# Patient Record
Sex: MALE | Race: OTHER | HISPANIC OR LATINO | Employment: UNEMPLOYED | URBAN - METROPOLITAN AREA
[De-identification: names, ages, dates, MRNs, and addresses within clinical notes are randomized per-mention and may not be internally consistent; named-entity substitution may affect disease eponyms.]

---

## 2017-01-31 ENCOUNTER — ALLSCRIPTS OFFICE VISIT (OUTPATIENT)
Dept: OTHER | Facility: OTHER | Age: 42
End: 2017-01-31

## 2017-01-31 DIAGNOSIS — E78.5 HYPERLIPIDEMIA: ICD-10-CM

## 2017-01-31 DIAGNOSIS — E03.9 HYPOTHYROIDISM: ICD-10-CM

## 2017-01-31 DIAGNOSIS — R73.01 IMPAIRED FASTING GLUCOSE: ICD-10-CM

## 2017-02-02 ENCOUNTER — APPOINTMENT (OUTPATIENT)
Dept: LAB | Facility: CLINIC | Age: 42
End: 2017-02-02
Payer: COMMERCIAL

## 2017-02-02 DIAGNOSIS — E78.5 HYPERLIPIDEMIA: ICD-10-CM

## 2017-02-02 DIAGNOSIS — R73.01 IMPAIRED FASTING GLUCOSE: ICD-10-CM

## 2017-02-02 DIAGNOSIS — E03.9 HYPOTHYROIDISM: ICD-10-CM

## 2017-02-02 LAB
ALBUMIN SERPL BCP-MCNC: 3.5 G/DL (ref 3.5–5)
ALP SERPL-CCNC: 77 U/L (ref 46–116)
ALT SERPL W P-5'-P-CCNC: 39 U/L (ref 12–78)
ANION GAP SERPL CALCULATED.3IONS-SCNC: 7 MMOL/L (ref 4–13)
AST SERPL W P-5'-P-CCNC: 31 U/L (ref 5–45)
BILIRUB SERPL-MCNC: 0.24 MG/DL (ref 0.2–1)
BUN SERPL-MCNC: 11 MG/DL (ref 5–25)
CALCIUM SERPL-MCNC: 8.8 MG/DL (ref 8.3–10.1)
CHLORIDE SERPL-SCNC: 101 MMOL/L (ref 100–108)
CHOLEST SERPL-MCNC: 284 MG/DL (ref 50–200)
CO2 SERPL-SCNC: 30 MMOL/L (ref 21–32)
CREAT SERPL-MCNC: 1.19 MG/DL (ref 0.6–1.3)
EST. AVERAGE GLUCOSE BLD GHB EST-MCNC: 146 MG/DL
GFR SERPL CREATININE-BSD FRML MDRD: >60 ML/MIN/1.73SQ M
GLUCOSE SERPL-MCNC: 103 MG/DL (ref 65–140)
HBA1C MFR BLD: 6.7 % (ref 4.2–6.3)
HDLC SERPL-MCNC: 42 MG/DL (ref 40–60)
LDLC SERPL DIRECT ASSAY-MCNC: 171 MG/DL (ref 0–100)
POTASSIUM SERPL-SCNC: 4 MMOL/L (ref 3.5–5.3)
PROT SERPL-MCNC: 7.7 G/DL (ref 6.4–8.2)
SODIUM SERPL-SCNC: 138 MMOL/L (ref 136–145)
T3 SERPL-MCNC: 0.8 NG/ML (ref 0.6–1.8)
T4 FREE SERPL-MCNC: 0.28 NG/DL (ref 0.76–1.46)
TRIGL SERPL-MCNC: 555 MG/DL
TSH SERPL DL<=0.05 MIU/L-ACNC: 82.2 UIU/ML (ref 0.36–3.74)

## 2017-02-02 PROCEDURE — 84439 ASSAY OF FREE THYROXINE: CPT

## 2017-02-02 PROCEDURE — 80061 LIPID PANEL: CPT

## 2017-02-02 PROCEDURE — 83036 HEMOGLOBIN GLYCOSYLATED A1C: CPT

## 2017-02-02 PROCEDURE — 80053 COMPREHEN METABOLIC PANEL: CPT

## 2017-02-02 PROCEDURE — 84443 ASSAY THYROID STIM HORMONE: CPT

## 2017-02-02 PROCEDURE — 36415 COLL VENOUS BLD VENIPUNCTURE: CPT

## 2017-02-02 PROCEDURE — 84480 ASSAY TRIIODOTHYRONINE (T3): CPT

## 2017-02-02 PROCEDURE — 83721 ASSAY OF BLOOD LIPOPROTEIN: CPT

## 2017-02-07 ENCOUNTER — ALLSCRIPTS OFFICE VISIT (OUTPATIENT)
Dept: OTHER | Facility: OTHER | Age: 42
End: 2017-02-07

## 2017-05-08 DIAGNOSIS — E03.9 HYPOTHYROIDISM: ICD-10-CM

## 2017-05-08 DIAGNOSIS — E78.5 HYPERLIPIDEMIA: ICD-10-CM

## 2017-05-08 DIAGNOSIS — E11.9 TYPE 2 DIABETES MELLITUS WITHOUT COMPLICATIONS (HCC): ICD-10-CM

## 2018-01-13 VITALS
HEIGHT: 69 IN | SYSTOLIC BLOOD PRESSURE: 134 MMHG | TEMPERATURE: 97.6 F | WEIGHT: 315 LBS | BODY MASS INDEX: 46.65 KG/M2 | DIASTOLIC BLOOD PRESSURE: 98 MMHG | HEART RATE: 84 BPM

## 2018-01-14 VITALS
BODY MASS INDEX: 46.65 KG/M2 | SYSTOLIC BLOOD PRESSURE: 120 MMHG | TEMPERATURE: 98.2 F | WEIGHT: 315 LBS | HEART RATE: 68 BPM | HEIGHT: 69 IN | DIASTOLIC BLOOD PRESSURE: 88 MMHG

## 2018-08-02 PROBLEM — E11.9 DIABETES MELLITUS, NEW ONSET (HCC): Status: ACTIVE | Noted: 2017-02-07

## 2018-08-02 RX ORDER — LEVOTHYROXINE SODIUM 0.05 MG/1
1 TABLET ORAL DAILY
COMMUNITY
Start: 2015-11-03 | End: 2018-08-29 | Stop reason: SDUPTHER

## 2018-08-03 ENCOUNTER — OFFICE VISIT (OUTPATIENT)
Dept: INTERNAL MEDICINE CLINIC | Facility: CLINIC | Age: 43
End: 2018-08-03
Payer: COMMERCIAL

## 2018-08-03 VITALS
WEIGHT: 312.61 LBS | TEMPERATURE: 98.5 F | SYSTOLIC BLOOD PRESSURE: 112 MMHG | DIASTOLIC BLOOD PRESSURE: 80 MMHG | HEART RATE: 84 BPM | BODY MASS INDEX: 44.75 KG/M2 | HEIGHT: 70 IN

## 2018-08-03 DIAGNOSIS — N43.2 OTHER HYDROCELE: ICD-10-CM

## 2018-08-03 DIAGNOSIS — E04.2 NON-TOXIC MULTINODULAR GOITER: ICD-10-CM

## 2018-08-03 DIAGNOSIS — E03.8 HYPOTHYROIDISM DUE TO HASHIMOTO'S THYROIDITIS: Primary | ICD-10-CM

## 2018-08-03 DIAGNOSIS — E06.3 HYPOTHYROIDISM DUE TO HASHIMOTO'S THYROIDITIS: Primary | ICD-10-CM

## 2018-08-03 DIAGNOSIS — N50.89 TESTICLE SWELLING: ICD-10-CM

## 2018-08-03 DIAGNOSIS — R40.0 DAYTIME SOMNOLENCE: ICD-10-CM

## 2018-08-03 DIAGNOSIS — E78.2 MIXED HYPERLIPIDEMIA: ICD-10-CM

## 2018-08-03 DIAGNOSIS — R06.83 SNORING: ICD-10-CM

## 2018-08-03 DIAGNOSIS — B35.6 TINEA CRURIS: ICD-10-CM

## 2018-08-03 DIAGNOSIS — E11.9 DIABETES MELLITUS, NEW ONSET (HCC): ICD-10-CM

## 2018-08-03 PROCEDURE — 3725F SCREEN DEPRESSION PERFORMED: CPT | Performed by: PHYSICIAN ASSISTANT

## 2018-08-03 PROCEDURE — 99214 OFFICE O/P EST MOD 30 MIN: CPT | Performed by: PHYSICIAN ASSISTANT

## 2018-08-03 RX ORDER — KETOCONAZOLE 20 MG/G
CREAM TOPICAL DAILY
Qty: 30 G | Refills: 1 | Status: SHIPPED | OUTPATIENT
Start: 2018-08-03 | End: 2019-08-30

## 2018-08-03 RX ORDER — AZITHROMYCIN 250 MG/1
TABLET, FILM COATED ORAL
COMMUNITY
Start: 2018-07-24 | End: 2018-08-03 | Stop reason: ALTCHOICE

## 2018-08-03 RX ORDER — IBUPROFEN 800 MG/1
TABLET ORAL
COMMUNITY
Start: 2018-07-24 | End: 2018-08-03 | Stop reason: ALTCHOICE

## 2018-08-03 NOTE — PROGRESS NOTES
Assessment/Plan:    No problem-specific Assessment & Plan notes found for this encounter  Diagnoses and all orders for this visit:    Hypothyroidism due to Hashimoto's thyroiditis  -     T3  -     T4, free  -     TSH, 3rd generation    Diabetes mellitus, new onset ([facility] [address] )  -     Comprehensive metabolic panel  -     Hemoglobin A1C  -     Microalbumin / creatinine urine ratio    Mixed hyperlipidemia  -     Lipid Panel with Direct LDL reflex    Tinea cruris  -     ketoconazole (NIZORAL) 2 % cream; Apply topically daily for 30 days    Testicle swelling    Snoring  -     Ambulatory referral to Sleep Medicine; Future    Daytime somnolence  -     Ambulatory referral to Sleep Medicine; Future    Other hydrocele  -     US scrotum and testicles; Future  -     Ambulatory referral to Urology; Future    Non-toxic multinodular goiter  -     US thyroid; Future    Other orders  -     levothyroxine 50 mcg tablet; Take 1 tablet by mouth daily  -     Discontinue: azithromycin (ZITHROMAX) 250 mg tablet;   -     Discontinue: ibuprofen (MOTRIN) 800 mg tablet;           Subjective:      Patient ID: Boo Alonso is a 41 y o  male  Patient last seen 1 5 years ago  Was diagnosed with DM and elevated cholesterol and never returned  Hypothyroidism, only took the script from 1 5 years ago  Here with new complaint of R testicle is bigger noticed this about 3 months ago, not pain but feels heavy and slight pinching  No change to urination  No trauma or injury    Patient also complaining of a bilateral rash to his groin  Patient has had this for the past 3-4 months  Patient reports he originally switch to boxer's due to the rash however with the testicle enlargement he switched back to briefs as it feels better  Patient also admits that he feels tired  He reports much of the day he feels okay but by mid to late afternoon he is very tired and often takes a nap      When asked about snoring his wife who accompanied him to this appointment says he does snore a lot and sometimes gasping  The following portions of the patient's history were reviewed and updated as appropriate: allergies, current medications, past family history, past medical history, past social history, past surgical history and problem list     Review of Systems   Constitutional: Positive for fatigue  HENT: Negative  Eyes: Negative  Negative for visual disturbance  Respiratory: Negative  Negative for cough and shortness of breath  Cardiovascular: Negative  Negative for chest pain, palpitations and leg swelling  Gastrointestinal: Negative for abdominal pain  Endocrine: Positive for heat intolerance and polyphagia  Negative for polydipsia and polyuria  Genitourinary: Positive for testicular pain  Negative for difficulty urinating, dysuria and frequency  Musculoskeletal: Negative  Skin: Positive for rash  Groin rash   Neurological: Negative for dizziness and headaches  Psychiatric/Behavioral: Negative  Objective:      /80 (BP Location: Left arm, Patient Position: Sitting, Cuff Size: Adult)   Pulse 84   Temp 98 5 °F (36 9 °C) (Oral)   Ht 5' 10" (1 778 m)   Wt (!) 142 kg (312 lb 9 8 oz)   BMI 44 86 kg/m²          Physical Exam   Constitutional: He is oriented to person, place, and time  He appears well-developed and well-nourished  HENT:   Head: Normocephalic and atraumatic  Narrow but patent airway   Eyes: Pupils are equal, round, and reactive to light  Neck: Neck supple  Thyromegaly present  Cardiovascular: Normal rate, regular rhythm and normal heart sounds  No murmur heard  Pulmonary/Chest: He is in respiratory distress  He has wheezes  Abdominal: Soft  Bowel sounds are normal  There is no tenderness  Genitourinary:       Left testis shows no mass and no tenderness     Genitourinary Comments: Chaperone in room during exam    Unable to palpate R testicle due to the swelling / hydrocele    L testicle no mass, no tenderness       Musculoskeletal: He exhibits edema (bilat LE non pitting oedema)  Lymphadenopathy:     He has no cervical adenopathy  Neurological: He is alert and oriented to person, place, and time  Skin: Rash noted  Moderate bilateral tines cruris   Psychiatric: He has a normal mood and affect   His behavior is normal

## 2018-08-03 NOTE — PATIENT INSTRUCTIONS
Get all the labs completed,    When you return for your results we will discuss what dose of thyroid medication as well as medications for diabetes will be started  Sched the ultrasound of scrotum for hydrocele and thyroid  Sched with urologist  Sched with sleep center  We discussed you likely have sleep apnea and this can continue to contribute to your weight, fatigue as well as sugar levels  Apt with me after labs and ultrasounds      We will complete your diabetic exam is when you return    This to include foot and eye exams

## 2018-08-07 ENCOUNTER — HOSPITAL ENCOUNTER (OUTPATIENT)
Dept: RADIOLOGY | Facility: HOSPITAL | Age: 43
Discharge: HOME | End: 2018-08-07
Payer: COMMERCIAL

## 2018-08-07 ENCOUNTER — APPOINTMENT (OUTPATIENT)
Dept: LAB | Facility: CLINIC | Age: 43
End: 2018-08-07
Payer: COMMERCIAL

## 2018-08-07 DIAGNOSIS — N43.2 OTHER HYDROCELE: ICD-10-CM

## 2018-08-07 DIAGNOSIS — E04.2 NON-TOXIC MULTINODULAR GOITER: ICD-10-CM

## 2018-08-07 LAB
ALBUMIN SERPL BCP-MCNC: 3.4 G/DL (ref 3.5–5)
ALP SERPL-CCNC: 72 U/L (ref 46–116)
ALT SERPL W P-5'-P-CCNC: 33 U/L (ref 12–78)
ANION GAP SERPL CALCULATED.3IONS-SCNC: 8 MMOL/L (ref 4–13)
AST SERPL W P-5'-P-CCNC: 29 U/L (ref 5–45)
BILIRUB SERPL-MCNC: 0.29 MG/DL (ref 0.2–1)
BUN SERPL-MCNC: 15 MG/DL (ref 5–25)
CALCIUM SERPL-MCNC: 8.7 MG/DL (ref 8.3–10.1)
CHLORIDE SERPL-SCNC: 104 MMOL/L (ref 100–108)
CHOLEST SERPL-MCNC: 281 MG/DL (ref 50–200)
CO2 SERPL-SCNC: 25 MMOL/L (ref 21–32)
CREAT SERPL-MCNC: 1.13 MG/DL (ref 0.6–1.3)
CREAT UR-MCNC: 182 MG/DL
EST. AVERAGE GLUCOSE BLD GHB EST-MCNC: 143 MG/DL
GFR SERPL CREATININE-BSD FRML MDRD: 80 ML/MIN/1.73SQ M
GLUCOSE P FAST SERPL-MCNC: 129 MG/DL (ref 65–99)
HBA1C MFR BLD: 6.6 % (ref 4.2–6.3)
HDLC SERPL-MCNC: 32 MG/DL (ref 40–60)
LDLC SERPL DIRECT ASSAY-MCNC: 153 MG/DL (ref 0–100)
MICROALBUMIN UR-MCNC: 16 MG/L (ref 0–20)
MICROALBUMIN/CREAT 24H UR: 9 MG/G CREATININE (ref 0–30)
POTASSIUM SERPL-SCNC: 4.1 MMOL/L (ref 3.5–5.3)
PROT SERPL-MCNC: 7.4 G/DL (ref 6.4–8.2)
SODIUM SERPL-SCNC: 137 MMOL/L (ref 136–145)
T3 SERPL-MCNC: 0.8 NG/ML (ref 0.6–1.8)
T4 FREE SERPL-MCNC: 0.29 NG/DL (ref 0.76–1.46)
TRIGL SERPL-MCNC: 843 MG/DL
TSH SERPL DL<=0.05 MIU/L-ACNC: 67.6 UIU/ML (ref 0.36–3.74)

## 2018-08-07 PROCEDURE — 83036 HEMOGLOBIN GLYCOSYLATED A1C: CPT | Performed by: PHYSICIAN ASSISTANT

## 2018-08-07 PROCEDURE — 82043 UR ALBUMIN QUANTITATIVE: CPT | Performed by: PHYSICIAN ASSISTANT

## 2018-08-07 PROCEDURE — 82570 ASSAY OF URINE CREATININE: CPT | Performed by: PHYSICIAN ASSISTANT

## 2018-08-07 PROCEDURE — 80061 LIPID PANEL: CPT | Performed by: PHYSICIAN ASSISTANT

## 2018-08-07 PROCEDURE — 83721 ASSAY OF BLOOD LIPOPROTEIN: CPT | Performed by: PHYSICIAN ASSISTANT

## 2018-08-07 PROCEDURE — 84480 ASSAY TRIIODOTHYRONINE (T3): CPT | Performed by: PHYSICIAN ASSISTANT

## 2018-08-07 PROCEDURE — 84443 ASSAY THYROID STIM HORMONE: CPT | Performed by: PHYSICIAN ASSISTANT

## 2018-08-07 PROCEDURE — 76870 US EXAM SCROTUM: CPT

## 2018-08-07 PROCEDURE — 80053 COMPREHEN METABOLIC PANEL: CPT | Performed by: PHYSICIAN ASSISTANT

## 2018-08-07 PROCEDURE — 36415 COLL VENOUS BLD VENIPUNCTURE: CPT | Performed by: PHYSICIAN ASSISTANT

## 2018-08-07 PROCEDURE — 84439 ASSAY OF FREE THYROXINE: CPT | Performed by: PHYSICIAN ASSISTANT

## 2018-08-07 PROCEDURE — 3061F NEG MICROALBUMINURIA REV: CPT | Performed by: PHYSICIAN ASSISTANT

## 2018-08-07 PROCEDURE — 76536 US EXAM OF HEAD AND NECK: CPT

## 2018-08-09 ENCOUNTER — TELEPHONE (OUTPATIENT)
Dept: INTERNAL MEDICINE CLINIC | Facility: CLINIC | Age: 43
End: 2018-08-09

## 2018-08-13 NOTE — TELEPHONE ENCOUNTER
ATTEMPTED TO CONTACT PT  AND HIS VOICEMAIL STATED "AT PRESCRIBERS REQUEST THIS NUMBER DOES NOT ACCEPT INCOMING CALLS "

## 2018-08-14 NOTE — TELEPHONE ENCOUNTER
3RD ATTEMPT TO CONTACT PATIENT , I RECEIVED THE SAME MSG AS SHIMA  CAN A LETTER PLEASE BE MAILED OUT TO PATIENTS HOME INFORMING HIM THAT WE HAVE BEEN TRYING TO CONTACT HIM REGARDING U/S RESULTS

## 2018-08-20 ENCOUNTER — TELEPHONE (OUTPATIENT)
Dept: UROLOGY | Facility: MEDICAL CENTER | Age: 43
End: 2018-08-20

## 2018-08-20 NOTE — TELEPHONE ENCOUNTER
Complaint/Diagnosis: Hydrocele    Insurance: [employer]    History of cancer: N/A    Previous Urologist: N/A    Outside testing/ Where: N/A    If yes, What kind: N/A    Records Requested/Where: N/A

## 2018-08-29 ENCOUNTER — OFFICE VISIT (OUTPATIENT)
Dept: INTERNAL MEDICINE CLINIC | Facility: CLINIC | Age: 43
End: 2018-08-29
Payer: COMMERCIAL

## 2018-08-29 VITALS
BODY MASS INDEX: 44.38 KG/M2 | WEIGHT: 309.97 LBS | HEART RATE: 60 BPM | HEIGHT: 70 IN | DIASTOLIC BLOOD PRESSURE: 84 MMHG | TEMPERATURE: 97.8 F | SYSTOLIC BLOOD PRESSURE: 120 MMHG

## 2018-08-29 DIAGNOSIS — E78.2 MIXED HYPERLIPIDEMIA: ICD-10-CM

## 2018-08-29 DIAGNOSIS — E11.9 CONTROLLED TYPE 2 DIABETES MELLITUS WITHOUT COMPLICATION, WITHOUT LONG-TERM CURRENT USE OF INSULIN (HCC): ICD-10-CM

## 2018-08-29 DIAGNOSIS — E66.01 CLASS 3 SEVERE OBESITY DUE TO EXCESS CALORIES WITH SERIOUS COMORBIDITY AND BODY MASS INDEX (BMI) OF 45.0 TO 49.9 IN ADULT (HCC): ICD-10-CM

## 2018-08-29 DIAGNOSIS — E06.3 HYPOTHYROIDISM DUE TO HASHIMOTO'S THYROIDITIS: Primary | ICD-10-CM

## 2018-08-29 DIAGNOSIS — N43.2 OTHER HYDROCELE: ICD-10-CM

## 2018-08-29 DIAGNOSIS — E03.8 HYPOTHYROIDISM DUE TO HASHIMOTO'S THYROIDITIS: Primary | ICD-10-CM

## 2018-08-29 PROBLEM — R40.0 DAYTIME SOMNOLENCE: Status: RESOLVED | Noted: 2018-08-03 | Resolved: 2018-08-29

## 2018-08-29 PROBLEM — N50.89 TESTICLE SWELLING: Status: RESOLVED | Noted: 2018-08-03 | Resolved: 2018-08-29

## 2018-08-29 LAB
LEFT EYE DIABETIC RETINOPATHY: NORMAL
LEFT EYE IMAGE QUALITY: NORMAL
RIGHT EYE DIABETIC RETINOPATHY: NORMAL
RIGHT EYE IMAGE QUALITY: NORMAL
SEVERITY (EYE EXAM): NORMAL

## 2018-08-29 PROCEDURE — 99214 OFFICE O/P EST MOD 30 MIN: CPT | Performed by: PHYSICIAN ASSISTANT

## 2018-08-29 PROCEDURE — 92250 FUNDUS PHOTOGRAPHY W/I&R: CPT | Performed by: PHYSICIAN ASSISTANT

## 2018-08-29 PROCEDURE — 3072F LOW RISK FOR RETINOPATHY: CPT | Performed by: PHYSICIAN ASSISTANT

## 2018-08-29 RX ORDER — ATORVASTATIN CALCIUM 20 MG/1
20 TABLET, FILM COATED ORAL DAILY
Qty: 90 TABLET | Refills: 0 | Status: SHIPPED | OUTPATIENT
Start: 2018-08-29 | End: 2018-11-30 | Stop reason: SDUPTHER

## 2018-08-29 RX ORDER — LEVOTHYROXINE SODIUM 0.05 MG/1
50 TABLET ORAL DAILY
Qty: 90 TABLET | Refills: 0 | Status: SHIPPED | OUTPATIENT
Start: 2018-08-29 | End: 2018-10-08 | Stop reason: ALTCHOICE

## 2018-08-29 NOTE — PROGRESS NOTES
Assessment/Plan:    No problem-specific Assessment & Plan notes found for this encounter  Diagnoses and all orders for this visit:    Hypothyroidism due to Hashimoto's thyroiditis  -     levothyroxine 50 mcg tablet; Take 1 tablet (50 mcg total) by mouth daily for 90 days  -     T4, free; Future  -     TSH, 3rd generation; Future    Controlled type 2 diabetes mellitus without complication, without long-term current use of insulin (HCC)  -     metFORMIN (GLUCOPHAGE) 500 mg tablet; Take 1 tablet (500 mg total) by mouth daily for 180 days  -     Hemoglobin A1C; Future  -     POCT diabetic eye exam    Other hydrocele    Mixed hyperlipidemia  -     atorvastatin (LIPITOR) 20 mg tablet; Take 1 tablet (20 mg total) by mouth daily for 90 days  -     Hepatic function panel; Future  -     Lipid Panel with Direct LDL reflex; Future    Class 3 severe obesity due to excess calories with serious comorbidity and body mass index (BMI) of 45 0 to 49 9 in adult [facility])          Subjective:      Patient ID: Vasquez Johnson is a 41 y o  male      Pt here for result review    Hydrocele R large aware testicles normal and is sched with urologist 8/30/18, confirms wearing briefs for support  Thyroid is enlarged but no nodules meet criteria for biopsy  TSH significantly elevated and T4 is low at 0 2      Cholesterol also sig elevated as are TG >800 admits eats fast foods most the time  Discussed thyroid also playing a roll on elevated lipids but most significant is diet of fast foods and weight, lack of exercise    Remains DM with A1C of 6 6 and in 2017 was 6 7  Will start on metformin to help and slight weigh loss assistance with the medication    DM foot exam OK will get eye exam IRIS scan today    Has sleep consultation on 9/19 for sleep apnea eval          The following portions of the patient's history were reviewed and updated as appropriate: allergies, current medications, past family history, past medical history, past social history, past surgical history and problem list     Review of Systems   Constitutional: Positive for fatigue  Negative for appetite change, chills, fever and unexpected weight change  HENT: Negative  Eyes: Negative for visual disturbance  Respiratory: Positive for shortness of breath (at night wakes from sleep)  Cardiovascular: Positive for leg swelling  Negative for chest pain and palpitations  Pt aware leg swelling a combination of thyroid, sleep apnea and high salt diet   Gastrointestinal: Positive for constipation  Negative for abdominal pain, diarrhea and vomiting  Endocrine: Positive for polydipsia and polyuria  Negative for cold intolerance  Genitourinary: Positive for frequency  Negative for difficulty urinating  Musculoskeletal: Negative  Skin:        Overall feels dry   Neurological: Negative for tremors, light-headedness and headaches  Psychiatric/Behavioral: Negative  Objective:      /84 (BP Location: Left arm, Patient Position: Sitting, Cuff Size: Large)   Pulse 60   Temp 97 8 °F (36 6 °C) (Oral)   Ht 5' 9 5" (1 765 m)   Wt (!) 141 kg (309 lb 15 5 oz)   BMI 45 12 kg/m²          Physical Exam   Constitutional: He is oriented to person, place, and time  He appears well-developed and well-nourished  He appears distressed  HENT:   Head: Normocephalic and atraumatic  Eyes: Pupils are equal, round, and reactive to light  Neck: Normal range of motion  No tracheal deviation present  Thyromegaly present  Large short neck   Cardiovascular: Normal rate and regular rhythm  Pulses are no weak pulses  Murmur heard  Pulses:       Dorsalis pedis pulses are 2+ on the right side, and 2+ on the left side  Pulmonary/Chest: Effort normal and breath sounds normal  He has no wheezes  He has no rales  Abdominal: Soft  He exhibits no distension  There is no tenderness  Limited by body habitus   Musculoskeletal: Normal range of motion   He exhibits edema (bilateral non pitting, no ulcers, no varicose veins)  Feet:   Right Foot:   Skin Integrity: Positive for callus and dry skin  Negative for ulcer, skin breakdown, erythema or warmth  Left Foot:   Skin Integrity: Positive for callus and dry skin  Negative for ulcer, skin breakdown, erythema or warmth  Lymphadenopathy:     He has no cervical adenopathy  Neurological: He is alert and oriented to person, place, and time  He has normal reflexes  Skin: Skin is dry  No rash noted  No erythema  Mild generalized dryness, no rash or lesions   Psychiatric: He has a normal mood and affect  His behavior is normal        Patient's shoes and socks removed  Right Foot/Ankle   Right Foot Inspection  Skin Exam: skin intact, dry skin, callus and callus no warmth, no erythema, no maceration, no abnormal color, no pre-ulcer and no ulcer                      Callus Size (cm):0 5      Sensory   Vibration: intact  Proprioception: intact   Monofilament testing: intact  Vascular    The right DP pulse is 2+  Left Foot/Ankle  Left Foot Inspection  Skin Exam: skin intact, dry skin and callusno warmth, no erythema, no maceration, normal color, no pre-ulcer and no ulcer                     Callus Size (cm): 0 5                    Sensory   Vibration: intact  Proprioception: intact  Monofilament: intact  Vascular    The left DP pulse is 2+  Assign Risk Category:  No deformity present; No loss of protective sensation;  No weak pulses       Risk: 0

## 2018-08-29 NOTE — PATIENT INSTRUCTIONS
Start the levothyroxine, take morning with water only and no other food / drinks for 30 to 60 minutes  Thyroid blood test in October    Start the Metformin 1 a day WITH dinner for diabetes  Start the atorvastatin at bedtime for cholesterol  Stop the fast foods  Read the additional information for diet changes  New labs for sugar and cholesterol as dated end of Nov    DM foot and eye exams today    Increase walking and elevate legs to help reduce swelling  Avoid high salt foods and drinks and this includes soda as also has sugar and salt    Heart Healthy Diet   AMBULATORY CARE:   A heart healthy diet  is an eating plan low in total fat, unhealthy fats, and sodium (salt)  A heart healthy diet helps decrease your risk for heart disease and stroke  Limit the amount of fat you eat to 25% to 35% of your total daily calories  Limit sodium to less than 2,300 mg each day  Healthy fats:  Healthy fats can help improve cholesterol levels  The risk for heart disease is decreased when cholesterol levels are normal  Choose healthy fats, such as the following:  · Unsaturated fat  is found in foods such as soybean, canola, olive, corn, and safflower oils  It is also found in soft tub margarine that is made with liquid vegetable oil  · Omega-3 fat  is found in certain fish, such as salmon, tuna, and trout, and in walnuts and flaxseed  Unhealthy fats:  Unhealthy fats can cause unhealthy cholesterol levels in your blood and increase your risk of heart disease  Limit unhealthy fats, such as the following:  · Cholesterol  is found in animal foods, such as eggs and lobster, and in dairy products made from whole milk  Limit cholesterol to less than 300 milligrams (mg) each day  You may need to limit cholesterol to 200 mg each day if you have heart disease  · Saturated fat  is found in meats, such as martinez and hamburger  It is also found in chicken or turkey skin, whole milk, and butter   Limit saturated fat to less than 7% of your total daily calories  Limit saturated fat to less than 6% if you have heart disease or are at increased risk for it  · Trans fat  is found in packaged foods, such as potato chips and cookies  It is also in hard margarine, some fried foods, and shortening  Avoid trans fats as much as possible    Heart healthy foods and drinks to include:  Ask your dietitian or healthcare provider how many servings to have from each of the following food groups:  · Grains:      ¨ Whole-wheat breads, cereals, and pastas, and brown rice    ¨ Low-fat, low-sodium crackers and chips    · Vegetables:      ¨ Broccoli, green beans, green peas, and spinach    ¨ Collards, kale, and lima beans    ¨ Carrots, sweet potatoes, tomatoes, and peppers    ¨ Canned vegetables with no salt added    · Fruits:      ¨ Bananas, peaches, pears, and pineapple    ¨ Grapes, raisins, and dates    ¨ Oranges, tangerines, grapefruit, orange juice, and grapefruit juice    ¨ Apricots, mangoes, melons, and papaya    ¨ Raspberries and strawberries    ¨ Canned fruit with no added sugar    · Low-fat dairy products:      ¨ Nonfat (skim) milk, 1% milk, and low-fat almond, cashew, or soy milks fortified with calcium    ¨ Low-fat cheese, regular or frozen yogurt, and cottage cheese    · Meats and proteins , such as lean cuts of beef and pork (loin, leg, round), skinless chicken and turkey, legumes, soy products, egg whites, and nuts  Foods and drinks to limit or avoid:  Ask your dietitian or healthcare provider about these and other foods that are high in unhealthy fat, sodium, and sugar:  · Snack or packaged foods , such as frozen dinners, cookies, macaroni and cheese, and cereals with more than 300 mg of sodium per serving    · Canned or dry mixes  for cakes, soups, sauces, or gravies    · Vegetables with added sodium , such as instant potatoes, vegetables with added sauces, or regular canned vegetables    · Other foods high in sodium , such as ketchup, barbecue sauce, salad dressing, pickles, olives, soy sauce, and miso    · High-fat dairy foods  such as whole or 2% milk, cream cheese, or sour cream, and cheeses     · High-fat protein foods  such as high-fat cuts of beef (T-bone steaks, ribs), chicken or turkey with skin, and organ meats, such as liver    · Cured or smoked meats , such as hot dogs, martinez, and sausage    · Unhealthy fats and oils , such as butter, stick margarine, shortening, and cooking oils such as coconut or palm oil    · Food and drinks high in sugar , such as soft drinks (soda), sports drinks, sweetened tea, candy, cake, cookies, pies, and doughnuts  Other diet guidelines to follow:   · Eat more foods containing omega-3 fats  Eat fish high in omega-3 fats at least 2 times a week  · Limit alcohol  Too much alcohol can damage your heart and raise your blood pressure  Women should limit alcohol to 1 drink a day  Men should limit alcohol to 2 drinks a day  A drink of alcohol is 12 ounces of beer, 5 ounces of wine, or 1½ ounces of liquor  · Choose low-sodium foods  High-sodium foods can lead to high blood pressure  Add little or no salt to food you prepare  Use herbs and spices in place of salt  · Eat more fiber  to help lower cholesterol levels  Eat at least 5 servings of fruits and vegetables each day  Eat 3 ounces of whole-grain foods each day  Legumes (beans) are also a good source of fiber  Lifestyle guidelines:   · Do not smoke  Nicotine and other chemicals in cigarettes and cigars can cause lung and heart damage  Ask your healthcare provider for information if you currently smoke and need help to quit  E-cigarettes or smokeless tobacco still contain nicotine  Talk to your healthcare provider before you use these products  · Exercise regularly  to help you maintain a healthy weight and improve your blood pressure and cholesterol levels  Ask your healthcare provider about the best exercise plan for you   Do not start an exercise program without asking your healthcare provider  Follow up with your healthcare provider as directed:  Write down your questions so you remember to ask them during your visits  © 2017 [employer] Information is for End User's use only and may not be sold, redistributed or otherwise used for commercial purposes  All illustrations and images included in CareNotes® are the copyrighted property of [employer]  or [employer]  The above information is an  only  It is not intended as medical advice for individual conditions or treatments  Talk to your doctor, nurse or pharmacist before following any medical regimen to see if it is safe and effective for you  Hypothyroidism   AMBULATORY CARE:   Hypothyroidism  is a condition that develops when the thyroid gland does not make enough thyroid hormone  Thyroid hormones help control body temperature, heart rate, growth, and weight  Common signs and symptoms include the following: The signs and symptoms may develop slowly, sometimes over several years  · Exhaustion    · Sensitivity to cold    · Headaches or decreased concentration    · Muscle aches or weakness    · Constipation     · Dry, flaky skin or brittle nails    · Thinning hair    · Heavy or irregular monthly periods    · Depression or irritability  Call 911 for any of the following:   · You have sudden chest pain or shortness of breath  · You have a seizure  · You feel like you are going to faint  Seek care immediately if:   · You have diarrhea, tremors, or trouble sleeping  · Your legs, ankles, or feet are swollen  Contact your healthcare provider if:   · You have a fever  · You have chills, a cough, or feel weak and achy  · You have pain and swelling in your muscles and joints  · Your skin is itchy, swollen, or you have a rash  · Your signs and symptoms return or get worse, even after treatment      · You have questions or concerns about your condition or care  Treatment:  Thyroid hormone replacement medicine may bring your thyroid hormone level back to normal  Ask your healthcare provider for more information on other medicines you may need  Follow up with your healthcare provider as directed: You may need to return for more blood tests to check your thyroid hormone level  This will show if you are getting the right amount of thyroid medicine  Write down your questions so you remember to ask them during your visits  © 2017 [employer] Information is for End User's use only and may not be sold, redistributed or otherwise used for commercial purposes  All illustrations and images included in CareNotes® are the copyrighted property of [employer]  or [employer]  The above information is an  only  It is not intended as medical advice for individual conditions or treatments  Talk to your doctor, nurse or pharmacist before following any medical regimen to see if it is safe and effective for you  Basic Carbohydrate Counting   AMBULATORY CARE:   Carbohydrate counting  is a way to plan your meals by counting the amount of carbohydrate in foods  Carbohydrates are the sugars, starches, and fiber found in fruit, grains, vegetables, and milk products  Carbohydrates increase your blood sugar levels  Carbohydrate counting can help you eat the right amount of carbohydrate to keep your blood sugar levels under control  What you need to know about planning meals using carbohydrate counting:  · A dietitian or healthcare provider will help you develop a healthy meal plan that works best for you  You will be taught how much carbohydrate to eat or drink for each meal and snack  Your meal plan will be based on your age, weight, usual food intake, and physical activity level  If you have diabetes, it will also include your blood sugar levels and diabetes medicine   Once you know how much carbohydrate you should eat, you can decide what type of food you want to eat  · You will need to know what foods contain carbohydrate and how much they contain  Keep track of the amount of carbohydrate in meals and snacks in order to follow your meal plan  Do not avoid carbohydrates or skip meals  Your blood sugar may fall too low if you do not eat enough carbohydrate or you skip meals  Foods that contain carbohydrate:   · Breads:  Each serving of food listed below contains about 15 g of carbohydrate   ¨ 1 slice of bread (1 ounce) or 1 flour or corn tortilla (6 inch)    ¨ ½ of a hamburger bun or ¼ of a large bagel (about 1 ounce)    ¨ 1 pancake (about 4 inches across and ¼ inch thick)    · Cereals and grains:  Serving sizes of ready-to-eat cereals vary  Look at the serving size and the total carbohydrate amount listed on the food label  Each serving of food listed below contains about 15 g of carbohydrate   ¨ ¾ cup of dry, unsweetened, ready-to-eat cereal or ¼ cup of low-fat granola     ¨ ½ cup of oatmeal or other cooked cereal     ¨ ? cup of cooked rice or pasta    · Starchy vegetables and beans:  Each serving of food listed below contains about 15 g of carbohydrate   ¨ ½ cup of corn, green peas, sweet potatoes, or mashed potatoes    ¨ ¼ of a large baked potato    ¨ ½ cup of beans, lentils, and peas (garbanzo, batres, kidney, white, split, black-eyed)    · Crackers and snacks:  Each serving of food listed below contains about 15 g of carbohydrate   ¨ 3 radha cracker squares or 8 animal crackers     ¨ 6 saltine-type crackers    ¨ 3 cups of popcorn or ¾ ounce of pretzels, potato chips, or tortilla chips    · Fruit:  Each serving of food listed below contains about 15 g of carbohydrate       ¨ 1 small (4 ounce) piece of fresh fruit or ¾ to 1 cup of fresh fruit    ¨ ½ cup of canned or frozen fruit, packed in natural juice    ¨ ½ cup (4 ounces) of unsweetened fruit juice    ¨ 2 tablespoons of dried fruit    · Desserts or sugary foods:  Each serving of food listed below contains about 15 g of carbohydrate   ¨ 2-inch square unfrosted cake or brownie     ¨ 2 small cookies    ¨ ½ cup of ice cream, frozen yogurt, or nondairy frozen yogurt    ¨ ¼ cup of sherbet or sorbet    ¨ 1 tablespoon of regular syrup, jam, or jelly    ¨ 2 tablespoons of light syrup    · Milk and yogurt:  Foods from the milk group contain about 12 g of carbohydrate per serving  ¨ 1 cup of fat-free or low-fat milk    ¨ 1 cup of soy milk    ¨ ? cup of fat-free, yogurt sweetened with artificial sweetener    · Non-starchy vegetables:  Each serving contains about 5 g of carbohydrate   Three servings of non-starch vegetables count as 1 carbohydrate serving  ¨ ½ cup of cooked vegetables or 1 cup of raw vegetables  This includes beets, broccoli, cabbage, cauliflower, cucumber, mushrooms, tomatoes, and zucchini    ¨ ½ cup of vegetable juice  How to use carbohydrate counting to plan meals:   · Count carbohydrate amounts using serving sizes:      ¨ Pasta dinner example: You plan to have pasta, tossed salad, and an 8-ounce glass of milk  Your healthcare provider tells you that you may have 4 carbohydrate servings for dinner  One carbohydrate serving of pasta is ? cup  One cup of pasta will equal 3 carbohydrate servings  An 8-ounce glass of milk will count as 1 carbohydrate serving  These amounts of food would equal 4 carbohydrate servings  One cup of tossed salad does not count toward your carbohydrate servings  · Count carbohydrate amounts using food labels:  Find the total amount of carbohydrate in a packaged food by reading the food label  Food labels tell you the serving size of the food and the total carbohydrate amount in each serving  Find the serving size on the food label and then decide how many servings you will eat  Multiply the number of servings you plan to eat by the carbohydrate amount per serving  ¨ Granola bar snack example:   Your meal plan allows you to have 2 carbohydrate servings (30 grams) of carbohydrate for a snack  You plan to eat 1 package of granola bars, which contains 2 bars  According to the food label, the serving size of food in this package is 1 bar  Each serving (1 bar) contains 25 grams of carbohydrate  The total amount of carbohydrate in this package of granola bars would be 50 g  Based on your meal plan, you should eat only 1 bar  Follow up with your healthcare provider as directed:  Write down your questions so you remember to ask them during your visits  © 2017 [facility] Information is for End User's use only and may not be sold, redistributed or otherwise used for commercial purposes  All illustrations and images included in CareNotes® are the copyrighted property of [employer]  or [employer]  The above information is an  only  It is not intended as medical advice for individual conditions or treatments  Talk to your doctor, nurse or pharmacist before following any medical regimen to see if it is safe and effective for you

## 2018-08-30 ENCOUNTER — OFFICE VISIT (OUTPATIENT)
Dept: UROLOGY | Facility: AMBULATORY SURGERY CENTER | Age: 43
End: 2018-08-30
Payer: COMMERCIAL

## 2018-08-30 VITALS — HEIGHT: 71 IN | BODY MASS INDEX: 43.37 KG/M2 | WEIGHT: 309.8 LBS

## 2018-08-30 DIAGNOSIS — N43.2 OTHER HYDROCELE: Primary | ICD-10-CM

## 2018-08-30 LAB
SL AMB  POCT GLUCOSE, UA: NORMAL
SL AMB LEUKOCYTE ESTERASE,UA: NORMAL
SL AMB POCT BILIRUBIN,UA: NORMAL
SL AMB POCT BLOOD,UA: NORMAL
SL AMB POCT CLARITY,UA: NORMAL
SL AMB POCT COLOR,UA: YELLOW
SL AMB POCT KETONES,UA: NORMAL
SL AMB POCT NITRITE,UA: NORMAL
SL AMB POCT PH,UA: 6
SL AMB POCT SPECIFIC GRAVITY,UA: 1.03
SL AMB POCT URINE PROTEIN: NORMAL
SL AMB POCT UROBILINOGEN: NORMAL

## 2018-08-30 PROCEDURE — 99244 OFF/OP CNSLTJ NEW/EST MOD 40: CPT | Performed by: UROLOGY

## 2018-08-30 PROCEDURE — 81002 URINALYSIS NONAUTO W/O SCOPE: CPT | Performed by: UROLOGY

## 2018-08-30 NOTE — PROGRESS NOTES
8/30/2018    Maris Melton  1975  [id number]        Assessment  Moderate right hydrocele      Discussion  We discussed his hydrocele at length in the office today  We discussed options including surgical management with hydrocelectomy versus continued observation  I do not advocate for aspiration of the hydrocele due to the high risk of quick recurrence  Based on discussion in the office today the patient wishes to return in 6 months time for surveillance  He was instructed to call sooner if he were to develop worsening pain or swelling  History of Present Illness  41 y o  male with a history of an enlarging right testicle  He states that he has had a swollen right testicle for the last few months  He recently had a scrotal ultrasound which shows a moderately large right-sided hydrocele  Otherwise there are no abnormalities on ultrasound evaluation  He states that he has mild discomfort from the hydrocele  He denies any lower urinary tract symptoms  He denies any trauma  He is currently unemployed  AUA Symptom Score  AUA SYMPTOM SCORE      Most Recent Value   AUA SYMPTOM SCORE   How often have you had a sensation of not emptying your bladder completely after you finished urinating? 0   How often have you had to urinate again less than two hours after you finished urinating? 0   How often have you found you stopped and started again several times when you urinate? 2   How often have you found it difficult to postpone urination? 0   How often have you had a weak urinary stream?  0   How often have you had to push or strain to begin urination? 1   How many times did you most typically get up to urinate from the time you went to bed at night until the time you got up in the morning?   2   Quality of Life: If you were to spend the rest of your life with your urinary condition just the way it is now, how would you feel about that?  3   AUA SYMPTOM SCORE  5          Review of Systems  Review of Systems   Constitutional: Negative  HENT: Negative  Eyes: Negative  Respiratory: Negative  Cardiovascular: Negative  Gastrointestinal: Negative  Endocrine: Negative  Genitourinary: Negative  Musculoskeletal: Negative  Skin: Negative  Allergic/Immunologic: Negative  Neurological: Negative  Hematological: Negative  Psychiatric/Behavioral: Negative  Past Medical History  Past Medical History:   Diagnosis Date    Deformity of rib     Last assessed: 03 Nov 2015    Diabetes mellitus ([facility] [address] )     Family history of thyroid problem     Neutrophilia     Last Assessed - 03 Nov 2015       Past Social History  Past Surgical History:   Procedure Laterality Date    TOOTH EXTRACTION  07/12/2018       Past Family History  Family History   Problem Relation Age of Onset    Diabetes Mother     Hypertension Mother     Thyroid disease Mother         hypothyroidism    Diabetes Father     Hyperlipidemia Father     Hypertension Father     Heart attack Brother     Hypertension Other     Diabetes Other        Past Social history  Social History     Social History    Marital status: Unknown     Spouse name: N/A    Number of children: N/A    Years of education: N/A     Occupational History    Not on file       Social History Main Topics    Smoking status: Current Every Day Smoker     Packs/day: 0 25     Years: 15 00    Smokeless tobacco: Never Used      Comment: Patient is trying to quit    Alcohol use Yes      Comment: Socially    Drug use: Yes     Types: Marijuana    Sexual activity: Yes     Partners: Female     Birth control/ protection: None     Other Topics Concern    Not on file     Social History Narrative    No narrative on file       Current Medications  Current Outpatient Prescriptions   Medication Sig Dispense Refill    atorvastatin (LIPITOR) 20 mg tablet Take 1 tablet (20 mg total) by mouth daily for 90 days 90 tablet 0    ketoconazole (NIZORAL) 2 % cream Apply topically daily for 30 days 30 g 1    levothyroxine 50 mcg tablet Take 1 tablet (50 mcg total) by mouth daily for 90 days 90 tablet 0    metFORMIN (GLUCOPHAGE) 500 mg tablet Take 1 tablet (500 mg total) by mouth daily for 180 days 90 tablet 1     No current facility-administered medications for this visit  Allergies  No Known Allergies    Past Medical History, Social History, Family History, medications and allergies were reviewed  Vitals  Vitals:    08/30/18 1124   Weight: (!) 141 kg (309 lb 12 8 oz)   Height: 5' 11" (1 803 m)       Physical Exam  Physical Exam    On examination he is in no acute distress  His abdomen is soft obese nontender nondistended   examination reveals a moderate to large right-sided hydrocele  The right testicle is nonpalpable  The left testicle is normal   There are no inguinal hernias  Phallus is normal   Skin is warm  Extremities without edema    Neurologic is grossly intact and nonfocal   Gait normal   Affect normal    Results  No results found for: PSA  Lab Results   Component Value Date    GLUCOSE 110 10/28/2015    CALCIUM 8 7 08/07/2018     08/07/2018    K 4 1 08/07/2018    CO2 25 08/07/2018     08/07/2018    BUN 15 08/07/2018    CREATININE 1 13 08/07/2018     Lab Results   Component Value Date    WBC 14 98 (H) 10/28/2015    HGB 14 5 10/28/2015    HCT 43 1 10/28/2015    MCV 91 10/28/2015     10/28/2015         Office Urine Dip  Recent Results (from the past 1 hour(s))   POCT urine dip    Collection Time: 08/30/18 11:29 AM   Result Value Ref Range    LEUKOCYTE ESTERASE,UA -      NITRITE,UA -     SL AMB POCT UROBILINOGEN -     SL AMB POCT URINE PROTEIN -      PH,UA 6 0      BLOOD,UA -      SPECIFIC GRAVITY,UA 1 030      KETONES,UA -      BILIRUBIN,UA -     GLUCOSE, UA -      COLOR,UA yellow      CLARITY,UA transparent    ]

## 2018-09-19 ENCOUNTER — OFFICE VISIT (OUTPATIENT)
Dept: SLEEP CENTER | Facility: CLINIC | Age: 43
End: 2018-09-19
Payer: COMMERCIAL

## 2018-09-19 VITALS
DIASTOLIC BLOOD PRESSURE: 80 MMHG | HEIGHT: 71 IN | BODY MASS INDEX: 43.68 KG/M2 | RESPIRATION RATE: 18 BRPM | HEART RATE: 68 BPM | SYSTOLIC BLOOD PRESSURE: 138 MMHG | WEIGHT: 312 LBS

## 2018-09-19 DIAGNOSIS — R06.83 SNORING: ICD-10-CM

## 2018-09-19 DIAGNOSIS — E11.9 CONTROLLED TYPE 2 DIABETES MELLITUS WITHOUT COMPLICATION, WITHOUT LONG-TERM CURRENT USE OF INSULIN (HCC): ICD-10-CM

## 2018-09-19 DIAGNOSIS — E03.9 ACQUIRED HYPOTHYROIDISM: ICD-10-CM

## 2018-09-19 DIAGNOSIS — E66.01 MORBID OBESITY WITH BMI OF 40.0-44.9, ADULT (HCC): ICD-10-CM

## 2018-09-19 DIAGNOSIS — R40.0 DAYTIME SOMNOLENCE: ICD-10-CM

## 2018-09-19 DIAGNOSIS — R53.83 OTHER FATIGUE: ICD-10-CM

## 2018-09-19 DIAGNOSIS — R29.818 SUSPECTED SLEEP APNEA: Primary | ICD-10-CM

## 2018-09-19 PROCEDURE — 99244 OFF/OP CNSLTJ NEW/EST MOD 40: CPT | Performed by: INTERNAL MEDICINE

## 2018-09-19 NOTE — PROGRESS NOTES
Review of Systems      Genitourinary need to urinate more than twice a night   Cardiology ankle/leg swelling   Gastrointestinal none   Neurology none   Constitutional fatigue and weight change   Integumentary none   Psychiatry none   Musculoskeletal none   Pulmonary wheezing and snoring   ENT throat clearing   Endocrine none   Hematological none

## 2018-09-19 NOTE — PROGRESS NOTES
Consultation - [address]  41 y o  male  :1975  TWZ:[id number]    Physician Requesting Consult: Ti Rivera PA-C     Reason for Consult : At your kind request I saw this patient for initial sleep evaluation today  The patient is here to evaluate for suspected Obstructive Sleep Apnea  Other Complaints:  Constant fatigue  PFSH, Problem List, Medications & Allergies were reviewed in EMR  He  has a past medical history of Deformity of rib; Diabetes mellitus ([address] ); Family history of thyroid problem; Hypertension; Neutrophilia; and Sleep apnea  He has a current medication list which includes the following prescription(s): atorvastatin, levothyroxine, metformin, and ketoconazole  HPI:  He has been snoring all my life  Snoring is loud and disturbs his wife  He is not aware of breathing difficulties during sleep or modifying factors  Restless Leg Syndrome: has suggestive symptoms:  Paresthesias in the lower extremities associated with prolonged immobilization and urges to move   Parasomnia activity: no features reported   Sleep Routine: Typical Bedtime:  11:30 p m  Gets OOB:  7:00 a m        TIB: 7 5 hrs   Sleep latency: < 15 minutes Sleep Interruptions:  1-2 x/night because of nocturia  Awakens: spontaneously feeling not always refreshed Patient Estimated [email or web address] hrs  He has Excessive daytime drowsiness and May occasionally doze off when sedentary  Leary Sleepiness Scale rated at Total score: 2 /24  Habits: reports that he has been smoking  He has a 3 75 pack-year smoking history  He has never used smokeless tobacco , reports that he drinks alcohol ,  reports that he uses drugs, including Marijuana  ,Caffeine use: limited , Exercise routine: sometimes   Family History:  Son has obstructive sleep apnea and uses CPAP  ROS: reviewed & as attached  Significant for weight fluctuates  He has some dyspnea on effort and wheezing    He reports swelling of his legs   He reported no cardiac symptoms  EXAM:  key  [x] system is Normal  [] see notes  VITALS     []  /80 (BP Location: Left arm, Cuff Size: Large)   Pulse 68   Resp 18   Ht 5' 11" (1 803 m)   Wt (!) 142 kg (312 lb)   BMI 43 52 kg/m²     GENERAL[x]  Well groomed male, well appearing, at his stated age, in no apparent distress  PSYCH     [x]  Alert and cooperative  Speech is clear & coherant  Mental state appears normal  Judgement & Insight good   EXTREM/  SKIN         [x]  1+ pedal edema  Skin is warm and dry  Color and hydration are good  HEAD       [x]     Craniofacial anatomy: normal  EOMI  TMJ & Sinuses are normal    Neck         []  Neck Circumference: 18 cm, appears thick and there's extra fatty tissue; no abnormal masses or Lymhadenopathy  Thyroid is somewhat enlarged and nodular  Trachea is central     Nasal        []  Airway airflow is reduced Septum is central, Mucous membranes appear normal  Turbinates are hypertrophied There is no rhinorrhea  Oral          []    Airway       crowded Modified Mallampati class IV (only hard palate visible)  Palate:  redundant soft palateTonsils: no hypertrophy  Teeth normal      CVS         [x]  Heart sounds are regular, No murmurs  RESP       [x]  Effort is normal  Air entry good bilaterally  No wheezes  The scattered rales  ABD         [x]  obese, soft & non-tender  CNS         [x]  Grossly intact  Normal gait  Rombergs Negative  MSK         [x]  Muscle bulk, tone and power  normal        IMPRESSION: Primary/secondary Sleep conditions (to Medical or psychiatric) & comorbidities   1  Suspected sleep apnea  Diagnostic Sleep Study    CPAP Study   2  Snoring  Ambulatory referral to Sleep Medicine   3  Other fatigue     4  Daytime somnolence  Ambulatory referral to Sleep Medicine   5  Controlled type 2 diabetes mellitus without complication, without long-term current use of insulin ([address] )     6  Acquired hypothyroidism     7   Morbid obesity with BMI of 40 0-44 9, adult ([facility] [address] )          PLAN:   1  Comprehensive counseling was provided on pathophysiology, diagnostic strategies & treatment options; effects on symptoms and comorbidities; risks of inadequate therapy; costs and insurance aspects  2  I advised on weight reduction, avoiding sleeping supine, using alcohol or sedating medications close to bed time and on safe driving practices  3  Nocturnal polysomnography is indicated and a diagnostic study will be scheduled  4  Patient is willing to try Positive airway pressure therapy and will be scheduled for a titration study if indicated  5  Follow-up will be scheduled after the studies to review results, further details of treatment options and to initiate/adjust therapy  Thank you for allowing me to participate in the care of this patient  I will keep you apprised of developments      Sincerely,     Authenticated electronically by Carrie South MD   on 39/41/48   Board Certified Specialist

## 2018-09-19 NOTE — PATIENT INSTRUCTIONS
What is BLAIR? Obstructive sleep apnea is a common and serious sleep disorder that causes you to stop breathing during sleep  The airway repeatedly becomes blocked, limiting the amount of air that reaches your lungs  When this happens, you may snore loudly or making choking noises as you try to breathe  Your brain and body becomes oxygen deprived and you may wake up  This may happen a few times a night, or in more severe cases, several hundred times a night  Sleep apnea can make you wake up in the morning feeling tired or unrefreshed even though you have had a full night of sleep  During the day, you may feel fatigued, have difficulty concentrating or you may even unintentionally fall asleep  This is because your body is waking up numerous times throughout the night, even though you might not be conscious of each awakening  The lack of oxygen your body receives can have negative long-term consequences for your health  This includes:  High blood pressure  Heart disease  Irregular heart rhythms  Stroke  Pre-diabetes and diabetes  Depression    Testing  An objective evaluation of your sleep may be needed before your board certified sleep physician can make a diagnosis  Options include:   In-lab overnight sleep study  This type of sleep study requires you to stay overnight at a sleep center, in a bed that may resemble a hotel room  You will sleep with sensors hooked up to various parts of your body  These sensors record your brain waves, heartbeat, breathing and movement  An overnight sleep study also provides your doctor with the most complete information about your sleep  Learn more about an overnight sleep study      Home sleep apnea test  Some patients with high risk factors for obstructive sleep apnea and no other medical disorders may be candidates for a home sleep apnea test  The testing equipment differs in that it is less complicated than what is used in an overnight sleep study   As such, does not give all the data an in-lab will and if negative, may not mean you do not have the problem  Treatment for sleep apnea  includes using a continuous positive airway pressure (CPAP) machine to keep your airway open during sleep  A mask is placed over your nose and mouth, or just your nose  The mask is hooked to the CPAP machine that blows a gentle stream of air into the mask when you breathe  This helps keep your airway open so you can breathe more regularly  Extra oxygen may be given to you through the machine  You may be given a mouth device  It looks like a mouth guard or dental retainer and stops your tongue and mouth tissues from blocking your throat while you sleep  Surgery may be needed to remove extra tissues that block your mouth, throat, or nose  Manage sleep apnea:   Do not smoke  Nicotine and other chemicals in cigarettes and cigars can cause lung damage  Ask your healthcare provider for information if you currently smoke and need help to quit  E-cigarettes or smokeless tobacco still contain nicotine  Talk to your healthcare provider before you use these products  Do not drink alcohol or take sedative medicine before you go to sleep  Alcohol and sedatives can relax the muscles and tissues around your throat  This can block the airflow to your lungs  Maintain a healthy weight  Excess tissue around your throat may restrict your breathing  Ask your healthcare provider for information if you need to lose weight  Sleep on your side or use pillows designed to prevent sleep apnea  This prevents your tongue or other tissues from blocking your throat  You can also raise the head of your bed  Driving Safety  Refrain from driving when drowsy  Follow up with your healthcare provider as directed:  Write down your questions so you remember to ask them during your visits  Go to AASM website for more information: Sleepeducation  org     What is BLAIR?    Obstructive sleep apnea is a common and serious sleep disorder that causes you to stop breathing during sleep  The airway repeatedly becomes blocked, limiting the amount of air that reaches your lungs  When this happens, you may snore loudly or making choking noises as you try to breathe  Your brain and body becomes oxygen deprived and you may wake up  This may happen a few times a night, or in more severe cases, several hundred times a night  Sleep apnea can make you wake up in the morning feeling tired or unrefreshed even though you have had a full night of sleep  During the day, you may feel fatigued, have difficulty concentrating or you may even unintentionally fall asleep  This is because your body is waking up numerous times throughout the night, even though you might not be conscious of each awakening  The lack of oxygen your body receives can have negative long-term consequences for your health  This includes:  High blood pressure  Heart disease  Irregular heart rhythms  Stroke  Pre-diabetes and diabetes  Depression    Testing  An objective evaluation of your sleep may be needed before your board certified sleep physician can make a diagnosis  Options include:   In-lab overnight sleep study  This type of sleep study requires you to stay overnight at a sleep center, in a bed that may resemble a hotel room  You will sleep with sensors hooked up to various parts of your body  These sensors record your brain waves, heartbeat, breathing and movement  An overnight sleep study also provides your doctor with the most complete information about your sleep  Learn more about an overnight sleep study      Home sleep apnea test  Some patients with high risk factors for obstructive sleep apnea and no other medical disorders may be candidates for a home sleep apnea test  The testing equipment differs in that it is less complicated than what is used in an overnight sleep study   As such, does not give all the data an in-lab will and if negative, may not mean you do not have the problem  Treatment for sleep apnea  includes using a continuous positive airway pressure (CPAP) machine to keep your airway open during sleep  A mask is placed over your nose and mouth, or just your nose  The mask is hooked to the CPAP machine that blows a gentle stream of air into the mask when you breathe  This helps keep your airway open so you can breathe more regularly  Extra oxygen may be given to you through the machine  You may be given a mouth device  It looks like a mouth guard or dental retainer and stops your tongue and mouth tissues from blocking your throat while you sleep  Surgery may be needed to remove extra tissues that block your mouth, throat, or nose  Manage sleep apnea:   Do not smoke  Nicotine and other chemicals in cigarettes and cigars can cause lung damage  Ask your healthcare provider for information if you currently smoke and need help to quit  E-cigarettes or smokeless tobacco still contain nicotine  Talk to your healthcare provider before you use these products  Do not drink alcohol or take sedative medicine before you go to sleep  Alcohol and sedatives can relax the muscles and tissues around your throat  This can block the airflow to your lungs  Maintain a healthy weight  Excess tissue around your throat may restrict your breathing  Ask your healthcare provider for information if you need to lose weight  Sleep on your side or use pillows designed to prevent sleep apnea  This prevents your tongue or other tissues from blocking your throat  You can also raise the head of your bed  Driving Safety  Refrain from driving when drowsy  Follow up with your healthcare provider as directed:  Write down your questions so you remember to ask them during your visits  Go to AAS website for more information: Sleepeducation  org

## 2018-09-26 ENCOUNTER — TELEPHONE (OUTPATIENT)
Dept: SLEEP CENTER | Facility: CLINIC | Age: 43
End: 2018-09-26

## 2018-09-26 NOTE — TELEPHONE ENCOUNTER
----- Message from Tommie Lambert MD sent at 9/25/2018  8:26 PM EDT -----  Approved  ----- Message -----  From: Sheldon Cloud MA  Sent: 9/20/2018  11:58 AM  To: Sleep Medicine [facility] [place], #    This sleep study needs approval      If approved please sign and return to clerical pool  If denied please include reasons why  Also provide alternative testing if warranted  Please sign and return to clerical pool

## 2018-10-08 ENCOUNTER — TELEPHONE (OUTPATIENT)
Dept: INTERNAL MEDICINE CLINIC | Facility: CLINIC | Age: 43
End: 2018-10-08

## 2018-10-08 ENCOUNTER — APPOINTMENT (OUTPATIENT)
Dept: LAB | Facility: HOSPITAL | Age: 43
End: 2018-10-08
Payer: COMMERCIAL

## 2018-10-08 DIAGNOSIS — E03.8 HYPOTHYROIDISM DUE TO HASHIMOTO'S THYROIDITIS: Primary | ICD-10-CM

## 2018-10-08 DIAGNOSIS — E03.8 HYPOTHYROIDISM DUE TO HASHIMOTO'S THYROIDITIS: ICD-10-CM

## 2018-10-08 DIAGNOSIS — E06.3 HYPOTHYROIDISM DUE TO HASHIMOTO'S THYROIDITIS: ICD-10-CM

## 2018-10-08 DIAGNOSIS — E06.3 HYPOTHYROIDISM DUE TO HASHIMOTO'S THYROIDITIS: Primary | ICD-10-CM

## 2018-10-08 LAB
T4 FREE SERPL-MCNC: 0.47 NG/DL (ref 0.76–1.46)
TSH SERPL DL<=0.05 MIU/L-ACNC: 61.5 UIU/ML (ref 0.36–3.74)

## 2018-10-08 PROCEDURE — 84439 ASSAY OF FREE THYROXINE: CPT

## 2018-10-08 PROCEDURE — 84443 ASSAY THYROID STIM HORMONE: CPT

## 2018-10-08 PROCEDURE — 36415 COLL VENOUS BLD VENIPUNCTURE: CPT

## 2018-10-08 RX ORDER — LEVOTHYROXINE SODIUM 0.07 MG/1
75 TABLET ORAL DAILY
Qty: 90 TABLET | Refills: 0 | Status: SHIPPED | OUTPATIENT
Start: 2018-10-08 | End: 2018-11-30

## 2018-11-25 ENCOUNTER — HOSPITAL ENCOUNTER (OUTPATIENT)
Dept: SLEEP CENTER | Facility: CLINIC | Age: 43
Discharge: HOME | End: 2018-11-25
Payer: COMMERCIAL

## 2018-11-25 DIAGNOSIS — R29.818 SUSPECTED SLEEP APNEA: ICD-10-CM

## 2018-11-25 PROCEDURE — 95810 POLYSOM 6/> YRS 4/> PARAM: CPT

## 2018-11-26 ENCOUNTER — TELEPHONE (OUTPATIENT)
Dept: INTERNAL MEDICINE CLINIC | Facility: CLINIC | Age: 43
End: 2018-11-26

## 2018-11-26 NOTE — PROGRESS NOTES
Sleep Study Documentation    Pre-Sleep Study       Sleep testing procedure explained to patient:YES    Patient napped prior to study:NO    Caffeine:Dayshift worker after 12PM   Caffeine use:NO    Alcohol:Dayshift workers after 5PM: Alcohol use:NO    Typical day for patient:YES       Study Documentation  Diagnostic   Snore: Moderate  Supplemental O2: no    Minimum SaO2 81%  Baseline SaO2   93%     Increased obstructive respiratory events in REM sleep     EKG abnormalities: no     EEG abnormalities: no    Study Terminated:no    Patient classification: disabled       Post-Sleep Study    Medication used at bedtime or during sleep study:NO    Patient reports time it took to fall asleep:20 to 30 minutes    Patient reports waking up during study:1 to 2 times  Patient reports returning to sleep in 10 to 30 minutes  Patient reports sleeping 4 to 6 hours without dreaming  Patient reports sleep during study:worse than usual    Patient rated sleepiness: Somewhat sleepy or tired    PAP treatment:no

## 2018-11-26 NOTE — TELEPHONE ENCOUNTER
Patient called back and has been informed  I explain to the patient that some of the blood work are for the same day of his appt and if he can got to the lab tomorrow to ask if he can have it done if not he would call me back and we can reschedule his appt

## 2018-11-27 ENCOUNTER — APPOINTMENT (OUTPATIENT)
Dept: LAB | Facility: CLINIC | Age: 43
End: 2018-11-27
Payer: COMMERCIAL

## 2018-11-27 DIAGNOSIS — E03.8 HYPOTHYROIDISM DUE TO HASHIMOTO'S THYROIDITIS: ICD-10-CM

## 2018-11-27 DIAGNOSIS — E11.9 CONTROLLED TYPE 2 DIABETES MELLITUS WITHOUT COMPLICATION, WITHOUT LONG-TERM CURRENT USE OF INSULIN (HCC): ICD-10-CM

## 2018-11-27 DIAGNOSIS — E06.3 HYPOTHYROIDISM DUE TO HASHIMOTO'S THYROIDITIS: ICD-10-CM

## 2018-11-27 DIAGNOSIS — E78.2 MIXED HYPERLIPIDEMIA: ICD-10-CM

## 2018-11-27 LAB
ALBUMIN SERPL BCP-MCNC: 3.4 G/DL (ref 3.5–5)
ALP SERPL-CCNC: 72 U/L (ref 46–116)
ALT SERPL W P-5'-P-CCNC: 30 U/L (ref 12–78)
AST SERPL W P-5'-P-CCNC: 25 U/L (ref 5–45)
BILIRUB DIRECT SERPL-MCNC: 0.11 MG/DL (ref 0–0.2)
BILIRUB SERPL-MCNC: 0.25 MG/DL (ref 0.2–1)
CHOLEST SERPL-MCNC: 282 MG/DL (ref 50–200)
HDLC SERPL-MCNC: 36 MG/DL (ref 40–60)
LDLC SERPL DIRECT ASSAY-MCNC: 179 MG/DL (ref 0–100)
PROT SERPL-MCNC: 7.3 G/DL (ref 6.4–8.2)
T4 FREE SERPL-MCNC: 0.28 NG/DL (ref 0.76–1.46)
TRIGL SERPL-MCNC: 523 MG/DL
TSH SERPL DL<=0.05 MIU/L-ACNC: 69.8 UIU/ML (ref 0.36–3.74)

## 2018-11-27 PROCEDURE — 80061 LIPID PANEL: CPT

## 2018-11-27 PROCEDURE — 36415 COLL VENOUS BLD VENIPUNCTURE: CPT

## 2018-11-27 PROCEDURE — 83721 ASSAY OF BLOOD LIPOPROTEIN: CPT

## 2018-11-27 PROCEDURE — 83036 HEMOGLOBIN GLYCOSYLATED A1C: CPT

## 2018-11-27 PROCEDURE — 84439 ASSAY OF FREE THYROXINE: CPT

## 2018-11-27 PROCEDURE — 84443 ASSAY THYROID STIM HORMONE: CPT

## 2018-11-27 PROCEDURE — 80076 HEPATIC FUNCTION PANEL: CPT

## 2018-11-28 ENCOUNTER — TELEPHONE (OUTPATIENT)
Dept: SLEEP CENTER | Facility: CLINIC | Age: 43
End: 2018-11-28

## 2018-11-28 LAB
EST. AVERAGE GLUCOSE BLD GHB EST-MCNC: 154 MG/DL
HBA1C MFR BLD: 7 % (ref 4.2–6.3)

## 2018-11-28 NOTE — TELEPHONE ENCOUNTER
Called patient cell and spoke with Shereen Richardson  Instructed her to have patient keep CPAP study for 12/2 in [place] as scheduled  She will relay the message to patient

## 2018-11-30 ENCOUNTER — OFFICE VISIT (OUTPATIENT)
Dept: INTERNAL MEDICINE CLINIC | Facility: CLINIC | Age: 43
End: 2018-11-30
Payer: COMMERCIAL

## 2018-11-30 VITALS
HEIGHT: 70 IN | BODY MASS INDEX: 44.44 KG/M2 | DIASTOLIC BLOOD PRESSURE: 66 MMHG | TEMPERATURE: 97.7 F | HEART RATE: 72 BPM | WEIGHT: 310.41 LBS | SYSTOLIC BLOOD PRESSURE: 110 MMHG

## 2018-11-30 DIAGNOSIS — E06.3 HYPOTHYROIDISM DUE TO HASHIMOTO'S THYROIDITIS: Primary | ICD-10-CM

## 2018-11-30 DIAGNOSIS — G47.33 OBSTRUCTIVE SLEEP APNEA: ICD-10-CM

## 2018-11-30 DIAGNOSIS — E66.01 CLASS 3 SEVERE OBESITY DUE TO EXCESS CALORIES WITH SERIOUS COMORBIDITY AND BODY MASS INDEX (BMI) OF 40.0 TO 44.9 IN ADULT (HCC): ICD-10-CM

## 2018-11-30 DIAGNOSIS — E03.8 HYPOTHYROIDISM DUE TO HASHIMOTO'S THYROIDITIS: Primary | ICD-10-CM

## 2018-11-30 DIAGNOSIS — E78.2 MIXED HYPERLIPIDEMIA: ICD-10-CM

## 2018-11-30 DIAGNOSIS — E11.9 CONTROLLED TYPE 2 DIABETES MELLITUS WITHOUT COMPLICATION, WITHOUT LONG-TERM CURRENT USE OF INSULIN (HCC): ICD-10-CM

## 2018-11-30 PROBLEM — R29.818 SUSPECTED SLEEP APNEA: Status: RESOLVED | Noted: 2018-09-19 | Resolved: 2018-11-30

## 2018-11-30 PROCEDURE — 99213 OFFICE O/P EST LOW 20 MIN: CPT | Performed by: PHYSICIAN ASSISTANT

## 2018-11-30 PROCEDURE — 3008F BODY MASS INDEX DOCD: CPT | Performed by: PHYSICIAN ASSISTANT

## 2018-11-30 RX ORDER — ATORVASTATIN CALCIUM 20 MG/1
20 TABLET, FILM COATED ORAL DAILY
Qty: 90 TABLET | Refills: 1 | Status: SHIPPED | OUTPATIENT
Start: 2018-11-30 | End: 2019-05-31 | Stop reason: SDUPTHER

## 2018-11-30 RX ORDER — LEVOTHYROXINE SODIUM 112 UG/1
112 TABLET ORAL DAILY
Qty: 90 TABLET | Refills: 0 | Status: SHIPPED | OUTPATIENT
Start: 2018-11-30 | End: 2019-01-08

## 2018-11-30 NOTE — PATIENT INSTRUCTIONS
Increase the levothyroxine to 112 mcg daily  Increase your metformin to 500mg twice a day  Continue atorvastatin 20 mg daily  Keep appt with sleep center later this week for titration study as scheduled for sleep apnea  Call here for thyroid lab results in 4 weeks  DM labs in 6 months

## 2018-11-30 NOTE — PROGRESS NOTES
Assessment/Plan:    No problem-specific Assessment & Plan notes found for this encounter  Diagnoses and all orders for this visit:    Hypothyroidism due to Hashimoto's thyroiditis  -     levothyroxine 112 mcg tablet; Take 1 tablet (112 mcg total) by mouth daily for 90 days  -     T4, free; Future  -     TSH, 3rd generation; Future  -     T3; Future    Controlled type 2 diabetes mellitus without complication, without long-term current use of insulin (HCC)  -     metFORMIN (GLUCOPHAGE) 500 mg tablet; Take 1 tablet (500 mg total) by mouth 2 (two) times a day with meals for 180 days  -     Comprehensive metabolic panel; Future  -     Hemoglobin A1C; Future  -     Microalbumin / creatinine urine ratio; Future    Mixed hyperlipidemia  -     atorvastatin (LIPITOR) 20 mg tablet; Take 1 tablet (20 mg total) by mouth daily for 180 days  -     Lipid Panel with Direct LDL reflex; Future    Obstructive sleep apnea    Class 3 severe obesity due to excess calories with serious comorbidity and body mass index (BMI) of 40 0 to 44 9 in [facility])          Subjective:      Patient ID: Katie Jordan is a 41 y o  male  Pt here for result review    Pt confirms taking his thyroid med daily in am with water only  Advised will need increased dose as level actually dropped  Thyroid is very low  Pt confirms feels tired, no weight gain but has not lost any  No confusion  Sometimes constipation but this has been improving with diet changes  Aware fatigue is combination of very low thyroid levels and BLAIR    Also sugar increased to 7% so will increase the metformin to twice a day    Continue atorvastatin, discussed may need additional change in dose once thyroid controlled  Reports scheduled for sleep center follow up with mask titration study this weekend for BLAIR    Pt saw urologist for hydrocele and is scheduled for follow up in March 2019, agreed on observation at this time    No new concerns today        The following portions of the patient's history were reviewed and updated as appropriate: allergies, current medications, past family history, past medical history, past social history, past surgical history and problem list     Review of Systems   Constitutional: Positive for fatigue  Negative for chills, fever and unexpected weight change  HENT: Negative  Eyes: Negative  Negative for visual disturbance  Respiratory: Negative  Negative for cough and chest tightness  Cardiovascular: Positive for leg swelling  Negative for chest pain and palpitations  Gastrointestinal: Positive for constipation (reports is improving)  Negative for abdominal pain and diarrhea  Endocrine: Negative for cold intolerance, heat intolerance, polydipsia, polyphagia and polyuria  Genitourinary: Negative  Musculoskeletal: Negative  Skin: Negative  Negative for rash  Neurological: Negative for speech difficulty, light-headedness, numbness and headaches  Psychiatric/Behavioral: Negative  Objective:      /66 (BP Location: Right arm, Patient Position: Sitting, Cuff Size: Large)   Pulse 72   Temp 97 7 °F (36 5 °C) (Oral)   Ht 5' 9 75" (1 772 m)   Wt (!) 141 kg (310 lb 6 5 oz)   BMI 44 86 kg/m²          Physical Exam   Constitutional: He is oriented to person, place, and time  He appears well-developed and well-nourished  HENT:   Head: Normocephalic and atraumatic  Eyes: Pupils are equal, round, and reactive to light  Neck: No JVD present  No thyromegaly present  Cardiovascular: Normal rate, regular rhythm and normal heart sounds  Pulmonary/Chest: Effort normal and breath sounds normal  No respiratory distress  Abdominal: Soft  Bowel sounds are normal  There is no tenderness  Musculoskeletal: He exhibits edema (1+ non pitting oedema)  Neurological: He is alert and oriented to person, place, and time  He displays normal reflexes  No cranial nerve deficit  He exhibits normal muscle tone     Skin: Skin is warm and dry  No rash noted  Psychiatric: He has a normal mood and affect   His behavior is normal

## 2019-01-02 NOTE — PROGRESS NOTES
Patient will be going to the lab tomorrow to complete overdue labs for:    T3   Order: [id number]   Status:  Active   Visible to patient:  No (Not Released) Next appt:  03/08/2019 at 10:30 AM in Urology ([facility]) Dx:  Hypothyroidism due to Hashimoto's thy            Order Details View Encounter Lab and Collection Details Routing Result History               Status of Other Orders     Future Expected On   T4, free 12/28/2018   TSH, 3rd generation 12/28/2018   Comprehensive metabolic panel 8/71/0684   Hemoglobin A1C 5/30/2019   Lipid Panel with Direct LDL reflex 5/30/2019   Microalbumin / creatinine urine ratio

## 2019-01-03 ENCOUNTER — APPOINTMENT (OUTPATIENT)
Dept: LAB | Facility: HOSPITAL | Age: 44
End: 2019-01-03
Payer: COMMERCIAL

## 2019-01-03 DIAGNOSIS — E03.8 HYPOTHYROIDISM DUE TO HASHIMOTO'S THYROIDITIS: ICD-10-CM

## 2019-01-03 DIAGNOSIS — E11.9 CONTROLLED TYPE 2 DIABETES MELLITUS WITHOUT COMPLICATION, WITHOUT LONG-TERM CURRENT USE OF INSULIN (HCC): ICD-10-CM

## 2019-01-03 DIAGNOSIS — E78.2 MIXED HYPERLIPIDEMIA: ICD-10-CM

## 2019-01-03 DIAGNOSIS — E06.3 HYPOTHYROIDISM DUE TO HASHIMOTO'S THYROIDITIS: ICD-10-CM

## 2019-01-03 LAB
ALBUMIN SERPL BCP-MCNC: 3.2 G/DL (ref 3.5–5)
ALP SERPL-CCNC: 73 U/L (ref 46–116)
ALT SERPL W P-5'-P-CCNC: 26 U/L (ref 12–78)
ANION GAP SERPL CALCULATED.3IONS-SCNC: 6 MMOL/L (ref 4–13)
AST SERPL W P-5'-P-CCNC: 22 U/L (ref 5–45)
BILIRUB SERPL-MCNC: 0.24 MG/DL (ref 0.2–1)
BUN SERPL-MCNC: 12 MG/DL (ref 5–25)
CALCIUM SERPL-MCNC: 8.4 MG/DL (ref 8.3–10.1)
CHLORIDE SERPL-SCNC: 103 MMOL/L (ref 100–108)
CHOLEST SERPL-MCNC: 286 MG/DL (ref 50–200)
CO2 SERPL-SCNC: 26 MMOL/L (ref 21–32)
CREAT SERPL-MCNC: 1.11 MG/DL (ref 0.6–1.3)
CREAT UR-MCNC: 176 MG/DL
EST. AVERAGE GLUCOSE BLD GHB EST-MCNC: 157 MG/DL
GFR SERPL CREATININE-BSD FRML MDRD: 82 ML/MIN/1.73SQ M
GLUCOSE P FAST SERPL-MCNC: 135 MG/DL (ref 65–99)
HBA1C MFR BLD: 7.1 % (ref 4.2–6.3)
HDLC SERPL-MCNC: 31 MG/DL (ref 40–60)
LDLC SERPL DIRECT ASSAY-MCNC: 159 MG/DL (ref 0–100)
MICROALBUMIN UR-MCNC: 16.3 MG/L (ref 0–20)
MICROALBUMIN/CREAT 24H UR: 9 MG/G CREATININE (ref 0–30)
POTASSIUM SERPL-SCNC: 4.3 MMOL/L (ref 3.5–5.3)
PROT SERPL-MCNC: 7.2 G/DL (ref 6.4–8.2)
SODIUM SERPL-SCNC: 135 MMOL/L (ref 136–145)
T3 SERPL-MCNC: 0.7 NG/ML (ref 0.6–1.8)
T4 FREE SERPL-MCNC: 0.27 NG/DL (ref 0.76–1.46)
TRIGL SERPL-MCNC: 934 MG/DL
TSH SERPL DL<=0.05 MIU/L-ACNC: 77.6 UIU/ML (ref 0.36–3.74)

## 2019-01-03 PROCEDURE — 82043 UR ALBUMIN QUANTITATIVE: CPT

## 2019-01-03 PROCEDURE — 82570 ASSAY OF URINE CREATININE: CPT

## 2019-01-03 PROCEDURE — 80061 LIPID PANEL: CPT

## 2019-01-03 PROCEDURE — 83036 HEMOGLOBIN GLYCOSYLATED A1C: CPT

## 2019-01-03 PROCEDURE — 36415 COLL VENOUS BLD VENIPUNCTURE: CPT

## 2019-01-03 PROCEDURE — 84480 ASSAY TRIIODOTHYRONINE (T3): CPT

## 2019-01-03 PROCEDURE — 3061F NEG MICROALBUMINURIA REV: CPT | Performed by: PHYSICIAN ASSISTANT

## 2019-01-03 PROCEDURE — 80053 COMPREHEN METABOLIC PANEL: CPT

## 2019-01-03 PROCEDURE — 84443 ASSAY THYROID STIM HORMONE: CPT

## 2019-01-03 PROCEDURE — 83721 ASSAY OF BLOOD LIPOPROTEIN: CPT

## 2019-01-03 PROCEDURE — 84439 ASSAY OF FREE THYROXINE: CPT

## 2019-01-04 ENCOUNTER — TELEPHONE (OUTPATIENT)
Dept: INTERNAL MEDICINE CLINIC | Facility: CLINIC | Age: 44
End: 2019-01-04

## 2019-01-08 DIAGNOSIS — E03.8 HYPOTHYROIDISM DUE TO HASHIMOTO'S THYROIDITIS: ICD-10-CM

## 2019-01-08 DIAGNOSIS — E06.3 HYPOTHYROIDISM DUE TO HASHIMOTO'S THYROIDITIS: ICD-10-CM

## 2019-01-08 DIAGNOSIS — E11.65 UNCONTROLLED TYPE 2 DIABETES MELLITUS WITH HYPERGLYCEMIA (HCC): Primary | ICD-10-CM

## 2019-01-08 RX ORDER — LEVOTHYROXINE SODIUM 175 UG/1
175 TABLET ORAL DAILY
Qty: 90 TABLET | Refills: 0 | Status: SHIPPED | OUTPATIENT
Start: 2019-01-08 | End: 2019-02-25

## 2019-01-08 NOTE — TELEPHONE ENCOUNTER
SPOKE TO PT  HE STATED HE IS TAKING HIS LIPITOR, METFORMIN AND LEVOTHYROXINE AS PRESCRIBED AND NOT MISSING ANY DOSES

## 2019-01-09 NOTE — TELEPHONE ENCOUNTER
TRIED TO CONTACT PATIENT AGAIN AND RELATIVE ANSWERED PHONE AND STATED THAT PATIENT WENT TO THE STORE, SHE WILL HAVE HIM RETURN MY CALL

## 2019-02-25 ENCOUNTER — TELEPHONE (OUTPATIENT)
Dept: INTERNAL MEDICINE CLINIC | Facility: CLINIC | Age: 44
End: 2019-02-25

## 2019-02-25 ENCOUNTER — APPOINTMENT (OUTPATIENT)
Dept: LAB | Facility: CLINIC | Age: 44
End: 2019-02-25
Payer: COMMERCIAL

## 2019-02-25 DIAGNOSIS — E06.3 HYPOTHYROIDISM DUE TO HASHIMOTO'S THYROIDITIS: ICD-10-CM

## 2019-02-25 DIAGNOSIS — E03.8 HYPOTHYROIDISM DUE TO HASHIMOTO'S THYROIDITIS: Primary | ICD-10-CM

## 2019-02-25 DIAGNOSIS — E06.3 HYPOTHYROIDISM DUE TO HASHIMOTO'S THYROIDITIS: Primary | ICD-10-CM

## 2019-02-25 DIAGNOSIS — E03.8 HYPOTHYROIDISM DUE TO HASHIMOTO'S THYROIDITIS: ICD-10-CM

## 2019-02-25 LAB
T4 FREE SERPL-MCNC: 0.76 NG/DL (ref 0.76–1.46)
TSH SERPL DL<=0.05 MIU/L-ACNC: 23.2 UIU/ML (ref 0.36–3.74)

## 2019-02-25 PROCEDURE — 84439 ASSAY OF FREE THYROXINE: CPT

## 2019-02-25 PROCEDURE — 36415 COLL VENOUS BLD VENIPUNCTURE: CPT

## 2019-02-25 PROCEDURE — 84443 ASSAY THYROID STIM HORMONE: CPT

## 2019-02-25 RX ORDER — LEVOTHYROXINE SODIUM 175 UG/1
TABLET ORAL
Qty: 104 TABLET | Refills: 0 | Status: SHIPPED | OUTPATIENT
Start: 2019-02-25 | End: 2019-05-31 | Stop reason: ALTCHOICE

## 2019-03-08 ENCOUNTER — OFFICE VISIT (OUTPATIENT)
Dept: UROLOGY | Facility: AMBULATORY SURGERY CENTER | Age: 44
End: 2019-03-08
Payer: COMMERCIAL

## 2019-03-08 VITALS
HEART RATE: 64 BPM | SYSTOLIC BLOOD PRESSURE: 126 MMHG | BODY MASS INDEX: 44.73 KG/M2 | DIASTOLIC BLOOD PRESSURE: 72 MMHG | WEIGHT: 302 LBS | HEIGHT: 69 IN

## 2019-03-08 DIAGNOSIS — N43.2 OTHER HYDROCELE: Primary | ICD-10-CM

## 2019-03-08 PROCEDURE — 99213 OFFICE O/P EST LOW 20 MIN: CPT | Performed by: NURSE PRACTITIONER

## 2019-03-08 NOTE — PROGRESS NOTES
3/8/2019    [facility]  1975  [id number]      Assessment  -Moderate right hydrocele    Discussion/Plan  Court Camarillo is a 41 y o  male being managed by Dr Garry Greenwood        -Patient noted to have moderately large right-sided hydrocele on physical exam   This appears unchanged compared to prior office visit  We discussed options which were recommended by Dr Garry Greenwood including observation verses elective hydrocelectomy  Informed consent was provided including all risks and benefits  At this time, patient is unsure and wishes to discuss with his wife  He will call our office sooner if he decides to move forward with surgery  Patient will otherwise return in 6 months for surveillance  He was instructed to call with any issues  -All questions answered, patient agrees with plan      History of Present Illness  41 y o  male with a history of moderately large right sided hydrocele presents today for follow up  Patient last seen in office in August 2018  Ultrasound had identified moderate right hydrocele  Patient had opted to continue observation versus hydrocelectomy  Since last office visit, patient states hydrocele has slightly improved  However, he still finds hydrocele bothersome and has difficulties with urination and intercourse  He denies any pain, and reports mild discomfort  No reports of lower urinary tract symptoms, gross hematuria, or dysuria  Review of Systems  Review of Systems   Constitutional: Negative  HENT: Negative  Respiratory: Negative  Cardiovascular: Negative  Gastrointestinal: Negative  Genitourinary: Negative for decreased urine volume, difficulty urinating, dysuria, flank pain, frequency, hematuria and urgency  Musculoskeletal: Negative  Skin: Negative  Neurological: Negative  Psychiatric/Behavioral: Negative        AUA SYMPTOM SCORE      Most Recent Value   AUA SYMPTOM SCORE   How often have you had a sensation of not emptying your bladder completely after you finished urinating? 0   How often have you had to urinate again less than two hours after you finished urinating? 2   How often have you found you stopped and started again several times when you urinate? 2   How often have you found it difficult to postpone urination? 1   How often have you had a weak urinary stream?  3   How often have you had to push or strain to begin urination? 3   How many times did you most typically get up to urinate from the time you went to bed at night until the time you got up in the morning?   2   Quality of Life: If you were to spend the rest of your life with your urinary condition just the way it is now, how would you feel about that?  3   AUA SYMPTOM SCORE  13            Past Medical History  Past Medical History:   Diagnosis Date    Deformity of rib     Last assessed: 03 Nov 2015    Diabetes mellitus ([facility] [address] )     Family history of thyroid problem     Hypertension     Neutrophilia     Last Assessed - 03 Nov 2015    Sleep apnea        Past Social History  Past Surgical History:   Procedure Laterality Date    TOOTH EXTRACTION  07/12/2018       Past Family History  Family History   Problem Relation Age of Onset    Diabetes Mother     Hypertension Mother     Thyroid disease Mother         hypothyroidism    Diabetes Father     Hyperlipidemia Father     Hypertension Father     Heart attack Brother     Hypertension Other     Diabetes Other        Past Social history  Social History     Socioeconomic History    Marital status: Unknown     Spouse name: Not on file    Number of children: Not on file    Years of education: Not on file    Highest education level: Not on file   Occupational History    Not on file   Social Needs    Financial resource strain: Not on file    Food insecurity:     Worry: Not on file     Inability: Not on file    Transportation needs:     Medical: Not on file     Non-medical: Not on file   Tobacco Use    Smoking status: Current Every Day Smoker     Packs/day: 0 25     Years: 15 00     Pack years: 3 75    Smokeless tobacco: Never Used    Tobacco comment: Patient is trying to quit   Substance and Sexual Activity    Alcohol use: Yes     Comment: Socially    Drug use: Yes     Types: Marijuana    Sexual activity: Yes     Partners: Female     Birth control/protection: None   Lifestyle    Physical activity:     Days per week: Not on file     Minutes per session: Not on file    Stress: Not on file   Relationships    Social connections:     Talks on phone: Not on file     Gets together: Not on file     Attends L[employer] day service: Not on file     Active member of club or organization: Not on file     Attends meetings of clubs or organizations: Not on file     Relationship status: Not on file    Intimate partner violence:     Fear of current or ex partner: Not on file     Emotionally abused: Not on file     Physically abused: Not on file     Forced sexual activity: Not on file   Other Topics Concern    Not on file   Social History Narrative    Not on file       Current Medications  Current Outpatient Medications   Medication Sig Dispense Refill    atorvastatin (LIPITOR) 20 mg tablet Take 1 tablet (20 mg total) by mouth daily for 180 days 90 tablet 1    ketoconazole (NIZORAL) 2 % cream Apply topically daily for 30 days 30 g 1    levothyroxine 175 mcg tablet Take 1 tablet daily Monday through Saturday, take 2 tablets together on Sunday  104 tablet 0    metFORMIN (GLUCOPHAGE) 1000 MG tablet Take 1 tablet (1,000 mg total) by mouth 2 (two) times a day with meals for 90 days 180 tablet 0     No current facility-administered medications for this visit  Allergies  No Known Allergies    Past Medical History, Social History, Family History, medications and allergies were reviewed  Vitals  There were no vitals filed for this visit  Physical Exam  Physical Exam   Constitutional: He is oriented to person, place, and time   He appears well-developed and well-nourished  Obese     HENT:   Head: Normocephalic  Eyes: Pupils are equal, round, and reactive to light  Neck: Normal range of motion  Cardiovascular: Normal rate and regular rhythm  Pulmonary/Chest: Effort normal    Abdominal: Soft  Normal appearance  There is no CVA tenderness  Genitourinary:   Genitourinary Comments: Moderately large right sided hydrocele is appreciated, nontender, no erythema    Musculoskeletal: Normal range of motion  Neurological: He is alert and oriented to person, place, and time  Skin: Skin is warm and dry  Psychiatric: He has a normal mood and affect  His behavior is normal  Judgment and thought content normal        Results    I have personally reviewed all pertinent lab results and reviewed with patient  No results found for: PSA  Lab Results   Component Value Date    GLUCOSE 110 10/28/2015    CALCIUM 8 4 01/03/2019     10/28/2015    K 4 3 01/03/2019    CO2 26 01/03/2019     01/03/2019    BUN 12 01/03/2019    CREATININE 1 11 01/03/2019     Lab Results   Component Value Date    WBC 14 98 (H) 10/28/2015    HGB 14 5 10/28/2015    HCT 43 1 10/28/2015    MCV 91 10/28/2015     10/28/2015     No results found for this or any previous visit (from the past 1 hour(s))

## 2019-03-08 NOTE — PATIENT INSTRUCTIONS
Hydrocele   WHAT YOU NEED TO KNOW:   What is a hydrocele? A hydrocele is a collection of fluid inside the scrotum  The scrotum holds the testicles  Hydroceles can occur in one or both sides of the scrotum and usually grow slowly  They are common in newborns  They also occur in children and adults  There are 2 kinds of hydroceles:  · Simple hydrocele:  A simple hydrocele can form at any age  This kind of hydrocele does not get bigger or smaller  · Communicating hydrocele:  A communicating hydrocele can form at any age but is more common in infants and children  This kind of hydrocele gets bigger and smaller  Size changes are caused by fluid flowing through a tube from the abdomen into the scrotum  This occurs when the tube does not close as it should  The hydrocele may grow larger when you are active  It may shrink when you are at rest  A hydrocele may occur with a hernia  A hernia is when part of the intestine goes through a hole in the lining of the abdomen  What causes a hydrocele? Hydroceles usually do not have a specific cause  An injury to the scrotum may cause a hydrocele to form  The injury may be a blow to the scrotum during sports activity or a car crash  Hydroceles may also form after surgery or infection in the scrotum  What are the signs and symptoms of a hydrocele? A painless, swollen scrotum is the most common sign of a hydrocele  Your scrotum may feel sore and heavy from the swelling  How is a hydrocele diagnosed? Your healthcare provider will ask about your swollen scrotum and examine you  Tell your healthcare provider about any injury to your scrotum  He will apply gentle pressure to your scrotum to check whether the hydrocele shrinks  Your healthcare provider may order these or other tests:  · Transillumination:  Transillumination is when your healthcare provider shines a bright light on your scrotum  This helps him see the fluid inside your scrotum   Transillumination can help identify other problems, such as a hernia  · Ultrasound: An ultrasound uses sound waves to show pictures of your scrotum on a monitor  An ultrasound can help confirm the presence of a hydrocele and identify any other problems with the scrotum  How is a hydrocele treated? Your hydrocele will usually go away on its own  Your child's hydrocele will usually go away by the time he is 3years old  The hydrocele will need to be removed if it does not go away, or gets very large     · Support of scrotum:  You may need to wear a fabric support device similar to a jock strap to decrease swelling  · Hydrocelectomy:  Hydrocelectomy is surgery to remove your hydrocele  Healthcare providers make an incision in your scrotum or groin  During surgery for a simple hydrocele, a small incision is made, and the fluid is removed  During surgery for a communicating hydrocele, healthcare providers use stitches to close the tube  The stitches stop the flow of fluid from the hydrocele to the abdomen  · Needle aspiration:  Healthcare providers put a needle through your scrotum and into your hydrocele  The fluid is drained from your scrotum through the needle  What are the risks of a hydrocele? · Your hydrocele may not go away on its own  It may get bigger and cause pain or a heavy feeling  You may also have a hernia if you have a communicating hydrocele  If a hernia is not treated, it may cause pain and damage to your organs  · You may get an infection after surgery  You may have fertility problems after surgery  Surgery to remove the hydrocele may cause another simple hydrocele to form  After surgery or aspiration, the hydrocele may return  When should I contact my healthcare provider? · Your scrotum is swollen  · The swelling gets bigger or does not go away  · You have questions or concerns about your condition or care  When should I seek immediate care?    · You have severe pain and swelling after an injury to the scrotum  CARE AGREEMENT:   You have the right to help plan your care  Learn about your health condition and how it may be treated  Discuss treatment options with your caregivers to decide what care you want to receive  You always have the right to refuse treatment  The above information is an  only  It is not intended as medical advice for individual conditions or treatments  Talk to your doctor, nurse or pharmacist before following any medical regimen to see if it is safe and effective for you  © 2017 [employer]  Information is for End User's use only and may not be sold, redistributed or otherwise used for commercial purposes  All illustrations and images included in CareNotes® are the copyrighted property of [employer]  or [employer]

## 2019-05-16 ENCOUNTER — HOSPITAL ENCOUNTER (EMERGENCY)
Facility: HOSPITAL | Age: 44
Discharge: HOME | End: 2019-05-16
Attending: EMERGENCY MEDICINE | Admitting: EMERGENCY MEDICINE
Payer: COMMERCIAL

## 2019-05-16 VITALS
RESPIRATION RATE: 16 BRPM | DIASTOLIC BLOOD PRESSURE: 75 MMHG | WEIGHT: 302.03 LBS | TEMPERATURE: 97.4 F | OXYGEN SATURATION: 99 % | HEART RATE: 69 BPM | SYSTOLIC BLOOD PRESSURE: 141 MMHG | BODY MASS INDEX: 44.6 KG/M2

## 2019-05-16 DIAGNOSIS — B35.9 RINGWORM: Primary | ICD-10-CM

## 2019-05-16 PROCEDURE — 99283 EMERGENCY DEPT VISIT LOW MDM: CPT | Performed by: PHYSICIAN ASSISTANT

## 2019-05-16 PROCEDURE — 99282 EMERGENCY DEPT VISIT SF MDM: CPT

## 2019-05-16 RX ORDER — CLOTRIMAZOLE AND BETAMETHASONE DIPROPIONATE 10; .64 MG/G; MG/G
CREAM TOPICAL
Qty: 45 G | Refills: 0 | Status: SHIPPED | OUTPATIENT
Start: 2019-05-16 | End: 2019-05-31 | Stop reason: SDUPTHER

## 2019-05-30 ENCOUNTER — APPOINTMENT (OUTPATIENT)
Dept: LAB | Facility: HOSPITAL | Age: 44
End: 2019-05-30
Payer: COMMERCIAL

## 2019-05-30 ENCOUNTER — TELEPHONE (OUTPATIENT)
Dept: INTERNAL MEDICINE CLINIC | Facility: CLINIC | Age: 44
End: 2019-05-30

## 2019-05-30 DIAGNOSIS — E03.8 HYPOTHYROIDISM DUE TO HASHIMOTO'S THYROIDITIS: ICD-10-CM

## 2019-05-30 DIAGNOSIS — E06.3 HYPOTHYROIDISM DUE TO HASHIMOTO'S THYROIDITIS: ICD-10-CM

## 2019-05-30 LAB
T4 FREE SERPL-MCNC: 0.56 NG/DL (ref 0.76–1.46)
TSH SERPL DL<=0.05 MIU/L-ACNC: 34 UIU/ML (ref 0.36–3.74)

## 2019-05-30 PROCEDURE — 36415 COLL VENOUS BLD VENIPUNCTURE: CPT

## 2019-05-30 PROCEDURE — 84443 ASSAY THYROID STIM HORMONE: CPT

## 2019-05-30 PROCEDURE — 84439 ASSAY OF FREE THYROXINE: CPT

## 2019-05-31 ENCOUNTER — OFFICE VISIT (OUTPATIENT)
Dept: INTERNAL MEDICINE CLINIC | Facility: CLINIC | Age: 44
End: 2019-05-31

## 2019-05-31 VITALS
HEART RATE: 72 BPM | SYSTOLIC BLOOD PRESSURE: 130 MMHG | BODY MASS INDEX: 42.92 KG/M2 | TEMPERATURE: 98 F | DIASTOLIC BLOOD PRESSURE: 80 MMHG | WEIGHT: 299.83 LBS | HEIGHT: 70 IN

## 2019-05-31 DIAGNOSIS — E03.8 HYPOTHYROIDISM DUE TO HASHIMOTO'S THYROIDITIS: Chronic | ICD-10-CM

## 2019-05-31 DIAGNOSIS — G47.33 OBSTRUCTIVE SLEEP APNEA: ICD-10-CM

## 2019-05-31 DIAGNOSIS — E78.2 MIXED HYPERLIPIDEMIA: ICD-10-CM

## 2019-05-31 DIAGNOSIS — E06.3 HYPOTHYROIDISM DUE TO HASHIMOTO'S THYROIDITIS: Chronic | ICD-10-CM

## 2019-05-31 DIAGNOSIS — E11.65 UNCONTROLLED TYPE 2 DIABETES MELLITUS WITH HYPERGLYCEMIA (HCC): Primary | ICD-10-CM

## 2019-05-31 DIAGNOSIS — E66.01 CLASS 3 SEVERE OBESITY DUE TO EXCESS CALORIES WITH SERIOUS COMORBIDITY AND BODY MASS INDEX (BMI) OF 40.0 TO 44.9 IN ADULT (HCC): ICD-10-CM

## 2019-05-31 DIAGNOSIS — B35.4 TINEA CORPORIS: Chronic | ICD-10-CM

## 2019-05-31 PROBLEM — B35.6 TINEA CRURIS: Status: RESOLVED | Noted: 2018-08-03 | Resolved: 2019-05-31

## 2019-05-31 PROCEDURE — 99214 OFFICE O/P EST MOD 30 MIN: CPT | Performed by: PHYSICIAN ASSISTANT

## 2019-05-31 RX ORDER — CLOTRIMAZOLE AND BETAMETHASONE DIPROPIONATE 10; .64 MG/G; MG/G
CREAM TOPICAL
Qty: 45 G | Refills: 0 | Status: SHIPPED | OUTPATIENT
Start: 2019-05-31 | End: 2019-08-30

## 2019-05-31 RX ORDER — ATORVASTATIN CALCIUM 20 MG/1
20 TABLET, FILM COATED ORAL DAILY
Qty: 90 TABLET | Refills: 1 | Status: SHIPPED | OUTPATIENT
Start: 2019-05-31 | End: 2019-08-12

## 2019-05-31 RX ORDER — LEVOTHYROXINE SODIUM 0.2 MG/1
200 TABLET ORAL
Qty: 90 TABLET | Refills: 0 | Status: SHIPPED | OUTPATIENT
Start: 2019-05-31 | End: 2019-07-12 | Stop reason: SDUPTHER

## 2019-07-10 ENCOUNTER — TELEPHONE (OUTPATIENT)
Dept: INTERNAL MEDICINE CLINIC | Facility: CLINIC | Age: 44
End: 2019-07-10

## 2019-07-11 ENCOUNTER — APPOINTMENT (OUTPATIENT)
Dept: LAB | Facility: HOSPITAL | Age: 44
End: 2019-07-11
Payer: COMMERCIAL

## 2019-07-11 DIAGNOSIS — E06.3 HYPOTHYROIDISM DUE TO HASHIMOTO'S THYROIDITIS: Chronic | ICD-10-CM

## 2019-07-11 DIAGNOSIS — E78.2 MIXED HYPERLIPIDEMIA: ICD-10-CM

## 2019-07-11 DIAGNOSIS — E11.65 UNCONTROLLED TYPE 2 DIABETES MELLITUS WITH HYPERGLYCEMIA (HCC): ICD-10-CM

## 2019-07-11 DIAGNOSIS — E03.8 HYPOTHYROIDISM DUE TO HASHIMOTO'S THYROIDITIS: Chronic | ICD-10-CM

## 2019-07-11 LAB
ALBUMIN SERPL BCP-MCNC: 3 G/DL (ref 3.5–5)
ALP SERPL-CCNC: 79 U/L (ref 46–116)
ALT SERPL W P-5'-P-CCNC: 24 U/L (ref 12–78)
ANION GAP SERPL CALCULATED.3IONS-SCNC: 4 MMOL/L (ref 4–13)
AST SERPL W P-5'-P-CCNC: 22 U/L (ref 5–45)
BILIRUB SERPL-MCNC: 0.27 MG/DL (ref 0.2–1)
BUN SERPL-MCNC: 10 MG/DL (ref 5–25)
CALCIUM SERPL-MCNC: 8.5 MG/DL (ref 8.3–10.1)
CHLORIDE SERPL-SCNC: 104 MMOL/L (ref 100–108)
CHOLEST SERPL-MCNC: 270 MG/DL (ref 50–200)
CO2 SERPL-SCNC: 28 MMOL/L (ref 21–32)
CREAT SERPL-MCNC: 1.04 MG/DL (ref 0.6–1.3)
EST. AVERAGE GLUCOSE BLD GHB EST-MCNC: 157 MG/DL
GFR SERPL CREATININE-BSD FRML MDRD: 89 ML/MIN/1.73SQ M
GLUCOSE P FAST SERPL-MCNC: 129 MG/DL (ref 65–99)
HBA1C MFR BLD: 7.1 % (ref 4.2–6.3)
HDLC SERPL-MCNC: 31 MG/DL (ref 40–60)
LDLC SERPL DIRECT ASSAY-MCNC: 116 MG/DL (ref 0–100)
POTASSIUM SERPL-SCNC: 4.1 MMOL/L (ref 3.5–5.3)
PROT SERPL-MCNC: 6.7 G/DL (ref 6.4–8.2)
SODIUM SERPL-SCNC: 136 MMOL/L (ref 136–145)
T4 FREE SERPL-MCNC: 0.29 NG/DL (ref 0.76–1.46)
TRIGL SERPL-MCNC: 934 MG/DL
TSH SERPL DL<=0.05 MIU/L-ACNC: 64 UIU/ML (ref 0.36–3.74)

## 2019-07-11 PROCEDURE — 83036 HEMOGLOBIN GLYCOSYLATED A1C: CPT

## 2019-07-11 PROCEDURE — 80053 COMPREHEN METABOLIC PANEL: CPT | Performed by: PHYSICIAN ASSISTANT

## 2019-07-11 PROCEDURE — 84443 ASSAY THYROID STIM HORMONE: CPT

## 2019-07-11 PROCEDURE — 36415 COLL VENOUS BLD VENIPUNCTURE: CPT | Performed by: PHYSICIAN ASSISTANT

## 2019-07-11 PROCEDURE — 83721 ASSAY OF BLOOD LIPOPROTEIN: CPT

## 2019-07-11 PROCEDURE — 84439 ASSAY OF FREE THYROXINE: CPT

## 2019-07-11 PROCEDURE — 80061 LIPID PANEL: CPT

## 2019-07-12 ENCOUNTER — OFFICE VISIT (OUTPATIENT)
Dept: INTERNAL MEDICINE CLINIC | Facility: CLINIC | Age: 44
End: 2019-07-12

## 2019-07-12 VITALS
WEIGHT: 297.18 LBS | SYSTOLIC BLOOD PRESSURE: 120 MMHG | BODY MASS INDEX: 42.55 KG/M2 | DIASTOLIC BLOOD PRESSURE: 80 MMHG | HEART RATE: 72 BPM | HEIGHT: 70 IN | TEMPERATURE: 98.8 F

## 2019-07-12 DIAGNOSIS — E11.65 UNCONTROLLED TYPE 2 DIABETES MELLITUS WITH HYPERGLYCEMIA (HCC): ICD-10-CM

## 2019-07-12 DIAGNOSIS — G47.33 OBSTRUCTIVE SLEEP APNEA: ICD-10-CM

## 2019-07-12 DIAGNOSIS — E06.3 HYPOTHYROIDISM DUE TO HASHIMOTO'S THYROIDITIS: Primary | Chronic | ICD-10-CM

## 2019-07-12 DIAGNOSIS — E78.2 MIXED HYPERLIPIDEMIA: ICD-10-CM

## 2019-07-12 DIAGNOSIS — E66.01 CLASS 3 SEVERE OBESITY DUE TO EXCESS CALORIES WITH SERIOUS COMORBIDITY AND BODY MASS INDEX (BMI) OF 40.0 TO 44.9 IN ADULT (HCC): ICD-10-CM

## 2019-07-12 DIAGNOSIS — R53.83 OTHER FATIGUE: ICD-10-CM

## 2019-07-12 DIAGNOSIS — E03.8 HYPOTHYROIDISM DUE TO HASHIMOTO'S THYROIDITIS: Primary | Chronic | ICD-10-CM

## 2019-07-12 PROBLEM — R40.0 DAYTIME SOMNOLENCE: Status: RESOLVED | Noted: 2018-08-03 | Resolved: 2019-07-12

## 2019-07-12 PROBLEM — R06.83 SNORING: Status: RESOLVED | Noted: 2018-08-03 | Resolved: 2019-07-12

## 2019-07-12 PROCEDURE — 99213 OFFICE O/P EST LOW 20 MIN: CPT | Performed by: PHYSICIAN ASSISTANT

## 2019-07-12 RX ORDER — LEVOTHYROXINE SODIUM 0.05 MG/1
50 TABLET ORAL DAILY
Qty: 90 TABLET | Refills: 0 | Status: SHIPPED | OUTPATIENT
Start: 2019-07-12 | End: 2019-08-12

## 2019-07-12 RX ORDER — LEVOTHYROXINE SODIUM 0.2 MG/1
200 TABLET ORAL DAILY
Qty: 90 TABLET | Refills: 0 | Status: SHIPPED | OUTPATIENT
Start: 2019-07-12 | End: 2019-08-12 | Stop reason: ALTCHOICE

## 2019-07-12 NOTE — PROGRESS NOTES
Assessment/Plan:    Discussed with patient based on our visit today that I will not adjust his metformin  He is aware that the goal of his treatment is to be below 7% however patient does admit to poor diet  Patient continues to admit he is eating rice, pasta, potatoes and hamburgers  Patient also reporting no exercise  Stressed importance of dietary changes as well as portion control for better improvement  Did review once again with patient that while there is some improvement in his cholesterol restarting his atorvastatin his numbers are also being affected by his currently uncontrolled hypothyroidism  Patient confirms compliance with his thyroid medication  Reports taking it every morning with water only and waiting at least 30-60 minutes for food  Unclear why his level is actually dropping despite compliance  Reviewed with patient that I have ordered some additional testing and will also increase his dose as well as switch him to brand medically necessary Synthroid  Will have patient do labs in 3-4 weeks and schedule a follow-up appointment here  If no additional improvement is noted with adjustment in dose and brand necessary will refer patient to Endocrinology  As reviewed with patient he confirms he has yet to call and schedule the titration study  Patient already had the sleep study which confirm sleep apnea  Reviewed once again the importance of sleep and the effective untreated sleep apnea on his general health now as well as effects on his heart in the future  Patient verbalized understanding and will call to schedule the appointment  No problem-specific Assessment & Plan notes found for this encounter  Diagnoses and all orders for this visit:    Hypothyroidism due to Hashimoto's thyroiditis  -     T3  -     T4, free; Future  -     TSH, 3rd generation; Future  -     HUMAN ANTI-MOUSE ANTIBODIES; Future  -     levothyroxine (SYNTHROID) 200 mcg tablet;  Take 1 tablet (200 mcg total) by mouth daily for 90 days  -     levothyroxine (SYNTHROID) 50 mcg tablet; Take 1 tablet (50 mcg total) by mouth daily for 90 days    Uncontrolled type 2 diabetes mellitus with hyperglycemia (HCC)    Obstructive sleep apnea    Class 3 severe obesity due to excess calories with serious comorbidity and body mass index (BMI) of 40 0 to 44 9 in adult [facility])    Mixed hyperlipidemia    Other fatigue  -     Testosterone, free, total; Future          Subjective:      Patient ID: Gali Chowdhury is a 41 y o  male  Patient coming in today for follow-up lab review and chronic medical condition  Last visit May 31, 2019    Patient currently under treatment for diabetes, hypothyroidism and hyperlipidemia  At last visit patient was advised to resume taking his atorvastatin as his cholesterol was significantly elevated  Patient's labs show that his cholesterol had  minimal improvement  This is likely combination of poor diet as well as his hypothyroidism  Patient may likely require a higher dose but want to get his thyroid under better control for proper evaluation  Patient's diabetic medication was not adjusted previously as the labs that he had were months prior to that and needed to get new labs for evaluation  A1c remains the same at 7 1%  Patient has not made many dietary or exercise changes since last visit  Patient reports he continues to eat rice, pasta, bread potatoes and [employer]  Patient also confirms very little exercise  Patient's thyroid function however shows that despite being restarted on his thyroid medication his T4 is even lower than previous  It was 0 5 now 0 29  Patient also known to have sleep apnea and as discussed at patient's last visit he was supposed to call and schedule his titration study  Had reviewed with patient at previous visit and again today the importance of treating his sleep apnea    As noted in records patient has not schedule this appointment      The following portions of the patient's history were reviewed and updated as appropriate: allergies, current medications, past family history, past medical history, past social history, past surgical history and problem list     Review of Systems   Constitutional: Negative  Negative for chills, fever and unexpected weight change  Patient does endorse some tiredness but denies overt fatigue  combination of his sleep apnea and hypothyroidism  Respiratory: Negative  Negative for cough and shortness of breath  Cardiovascular: Negative for chest pain  Gastrointestinal: Negative  Negative for abdominal pain, constipation, diarrhea, nausea and vomiting  Endocrine: Negative  Negative for cold intolerance, heat intolerance, polydipsia, polyphagia and polyuria  Skin: Negative for rash  Neurological: Negative for dizziness, light-headedness and numbness  Psychiatric/Behavioral: Negative  Objective:      /80 (BP Location: Right arm, Patient Position: Sitting, Cuff Size: Large)   Pulse 72   Temp 98 8 °F (37 1 °C) (Oral)   Ht 5' 9 5" (1 765 m)   Wt 135 kg (297 lb 2 9 oz)   BMI 43 26 kg/m²          Physical Exam   Constitutional: He is oriented to person, place, and time  He appears well-developed and well-nourished  HENT:   Head: Normocephalic  Neck: Thyromegaly ( known mild thyromegaly no nodules  ) present  Cardiovascular: Normal rate, regular rhythm and normal heart sounds  No murmur heard  Pulmonary/Chest: Effort normal and breath sounds normal  He has no wheezes  Abdominal: Soft  Bowel sounds are normal    Neurological: He is alert and oriented to person, place, and time  No cranial nerve deficit  Skin: Skin is warm and dry  No rash noted  Psychiatric: He has a normal mood and affect  His behavior is normal    Nursing note and vitals reviewed

## 2019-07-12 NOTE — PATIENT INSTRUCTIONS
10% - bad control"> 10% - bad control,Hemoglobin A1c (HbA1c) greater than 10% indicating poor diabetic control,Haemoglobin A1c greater than 10% indicating poor diabetic control">   Diabetes Mellitus Type 2 in Adults, Ambulatory Care   GENERAL INFORMATION:   Diabetes mellitus type 2  is a disease that affects how your body uses glucose (sugar)  Insulin helps move sugar out of the blood so it can be used for energy  Normally, when the blood sugar level increases, the pancreas makes more insulin  Type 2 diabetes develops because either the body cannot make enough insulin, or it cannot use the insulin correctly  After many years, your pancreas may stop making insulin  Common symptoms include the following:   · More hunger or thirst than usual     · Frequent urination     · Weight loss without trying     · Blurred vision  Seek immediate care for the following symptoms:   · Severe abdominal pain, or pain that spreads to your back  You may also be vomiting  · Trouble staying awake or focusing    · Shaking or sweating    · Blurred or double vision    · Breath has a fruity, sweet smell    · Breathing is deep and labored, or rapid and shallow    · Heartbeat is fast and weak  Treatment for diabetes mellitus type 2  includes keeping your blood sugar at a normal level  You must eat the right foods, and exercise regularly  You may also need medicine if you cannot control your blood sugar level with nutrition and exercise  Manage diabetes mellitus type 2:   · Check your blood sugar level  You will be taught how to check a small drop of blood in a glucose monitor  Ask your healthcare provider when and how often to check during the day  Ask your healthcare provider what your blood sugar levels should be when you check them  · Keep track of carbohydrates (sugar and starchy foods)  Your blood sugar level can get too high if you eat too many carbohydrates   Your dietitian will help you plan meals and snacks that have the right amount of carbohydrates  · Eat low-fat foods  Some examples are skinless chicken and low-fat milk  · Eat less sodium (salt)  Some examples of high-sodium foods to limit are soy sauce, potato chips, and soup  Do not add salt to food you cook  Limit your use of table salt  · Eat high-fiber foods  Foods that are a good source of fiber include vegetables, whole grain bread, and beans  · Limit alcohol  Alcohol affects your blood sugar level and can make it harder to manage your diabetes  Women should limit alcohol to 1 drink a day  Men should limit alcohol to 2 drinks a day  A drink of alcohol is 12 ounces of beer, 5 ounces of wine, or 1½ ounces of liquor  · Get regular exercise  Exercise can help keep your blood sugar level steady, decrease your risk of heart disease, and help you lose weight  Exercise for at least 30 minutes, 5 days a week  Include muscle strengthening activities 2 days each week  Work with your healthcare provider to create an exercise plan  · Check your feet each day  for injuries or open sores  Ask your healthcare provider for activities you can do if you have an open sore  · Quit smoking  If you smoke, it is never too late to quit  Smoking can worsen the problems that may occur with diabetes  Ask your healthcare provider for information about how to stop smoking if you are having trouble quitting  · Ask about your weight:  Ask healthcare providers if you need to lose weight, and how much to lose  Ask them to help you with a weight loss program  Even a 10 to 15 pound weight loss can help you manage your blood sugar level  · Carry medical alert identification  Wear medical alert jewelry or carry a card that says you have diabetes  Ask your healthcare provider where to get these items  · Ask about vaccines  Diabetes puts you at risk of serious illness if you get the flu, pneumonia, or hepatitis   Ask your healthcare provider if you should get a flu, pneumonia, or hepatitis B vaccine, and when to get the vaccine  Follow up with your healthcare provider as directed:  Write down your questions so you remember to ask them during your visits  CARE AGREEMENT:   You have the right to help plan your care  Learn about your health condition and how it may be treated  Discuss treatment options with your caregivers to decide what care you want to receive  You always have the right to refuse treatment  The above information is an  only  It is not intended as medical advice for individual conditions or treatments  Talk to your doctor, nurse or pharmacist before following any medical regimen to see if it is safe and effective for you  © 2014 [address] is for End User's use only and may not be sold, redistributed or otherwise used for commercial purposes  All illustrations and images included in CareNotes® are the copyrighted property of [employer]  or Filippo Julien  Sleep Apnea   WHAT YOU NEED TO KNOW:   What is sleep apnea? Sleep apnea is also called obstructive sleep apnea  It is a condition that causes you to stop breathing for 10 seconds or more while you are sleeping  During normal sleep, your throat is kept open by muscles that let air pass through easily  Sleep apnea causes the muscles and tissues around your throat to relax and block air from passing through  Sleep apnea may happen many times while you are asleep  What increases my risk for sleep apnea? · Drinking alcohol or taking medicine to relax you before you sleep    · Obesity or a neck measuring 16 inches (41 centimeters) or more     · Smoking cigarettes    · Being a man or a menopausal woman    · A family history of sleep apnea    · Certain medical conditions, such as high blood pressure, diabetes, or stroke     · A deviated septum, a large tongue or tonsils, an overbite, or a small chin  What are the signs and symptoms of sleep apnea?    · No signs of breathing for 10 seconds or more while you sleep    · Snoring loudly, snorting, gasping or choking while you sleep, and waking up suddenly because of these    · A hard time thinking, remembering things, or focusing on your tasks the following day    · Headache or nausea    · Bedwetting or waking up often during the night to urinate    · Feeling sleepy, slow, and tired during the day  How is sleep apnea diagnosed? · Your healthcare provider will ask about your signs and symptoms, when they began, and how bad they are  He may ask about medical conditions you have and what medicines you take  Tell your healthcare provider if you smoke and whether anyone in your family has sleep apnea  Your healthcare provider may ask the person who sleeps beside you about your signs  · You may need to wear a device that monitors the oxygen level in your blood while you sleep  You may need to have a sleep study (polysomnography) if you have daytime sleepiness  A sleep study helps show your brain, heart, and respiratory system are working during sleep  Sleep studies may monitor the stages of sleep, oxygen levels, body position, eye movement, and snoring  How is sleep apnea treated? · A continuous positive airway pressure (CPAP)  machine is used to keep your airway open during sleep  A mask is placed over your nose and mouth, or just your nose  The mask is hooked to the CPAP machine  The CPAP blows a gentle stream of air into the mask when you breathe  This helps keep your airway open so you can breathe more regularly  Extra oxygen may be given through the machine  · A mouth device  may be recommended by your healthcare provider  The device looks like a mouth guard or dental retainer  It stops your tongue and mouth tissues from blocking your throat while you sleep  · Surgery  may be needed to remove extra tissues that block your mouth, throat, or nose  How can I manage my symptoms? · Do not smoke    Nicotine and other chemicals in cigarettes and cigars can cause lung damage  Ask your healthcare provider for information if you currently smoke and need help to quit  E-cigarettes or smokeless tobacco still contain nicotine  Talk to your healthcare provider before you use these products  · Do not drink alcohol or take sedative medicine before you go to sleep  Alcohol and sedatives can relax the muscles and tissues around your throat  This can block the airflow to your lungs  · Maintain a healthy weight  Excess tissue around your throat may restrict your breathing  Ask your healthcare provider for information if you need to lose weight  · Sleep on your side or use pillows designed to prevent sleep apnea  This prevents your tongue or other tissues from blocking your throat  You can also raise the head of your bed  When should I seek immediate care? · You have chest pain or trouble breathing  When should I contact my healthcare provider? · You feel very tired or depressed  · You have trouble staying awake during the day  · You have trouble thinking and remembering things  · You have questions or concerns about your condition or care  CARE AGREEMENT:   You have the right to help plan your care  Learn about your health condition and how it may be treated  Discuss treatment options with your caregivers to decide what care you want to receive  You always have the right to refuse treatment  The above information is an  only  It is not intended as medical advice for individual conditions or treatments  Talk to your doctor, nurse or pharmacist before following any medical regimen to see if it is safe and effective for you  © 2017 [address] Information is for End User's use only and may not be sold, redistributed or otherwise used for commercial purposes   All illustrations and images included in CareNotes® are the copyrighted property of [employer]  or [employer]  Heart Healthy Diet   AMBULATORY CARE:   A heart healthy diet  is an eating plan low in total fat, unhealthy fats, and sodium (salt)  A heart healthy diet helps decrease your risk for heart disease and stroke  Limit the amount of fat you eat to 25% to 35% of your total daily calories  Limit sodium to less than 2,300 mg each day  Healthy fats:  Healthy fats can help improve cholesterol levels  The risk for heart disease is decreased when cholesterol levels are normal  Choose healthy fats, such as the following:  · Unsaturated fat  is found in foods such as soybean, canola, olive, corn, and safflower oils  It is also found in soft tub margarine that is made with liquid vegetable oil  · Omega-3 fat  is found in certain fish, such as salmon, tuna, and trout, and in walnuts and flaxseed  Unhealthy fats:  Unhealthy fats can cause unhealthy cholesterol levels in your blood and increase your risk of heart disease  Limit unhealthy fats, such as the following:  · Cholesterol  is found in animal foods, such as eggs and lobster, and in dairy products made from whole milk  Limit cholesterol to less than 300 milligrams (mg) each day  You may need to limit cholesterol to 200 mg each day if you have heart disease  · Saturated fat  is found in meats, such as martinez and hamburger  It is also found in chicken or turkey skin, whole milk, and butter  Limit saturated fat to less than 7% of your total daily calories  Limit saturated fat to less than 6% if you have heart disease or are at increased risk for it  · Trans fat  is found in packaged foods, such as potato chips and cookies  It is also in hard margarine, some fried foods, and shortening  Avoid trans fats as much as possible    Heart healthy foods and drinks to include:  Ask your dietitian or healthcare provider how many servings to have from each of the following food groups:  · Grains:      ¨ Whole-wheat breads, cereals, and pastas, and Kashif Sails rice    ¨ Low-fat, low-sodium crackers and chips    · Vegetables:      ¨ Broccoli, green beans, green peas, and spinach    ¨ Collards, kale, and lima beans    ¨ Carrots, sweet potatoes, tomatoes, and peppers    ¨ Canned vegetables with no salt added    · Fruits:      ¨ Bananas, peaches, pears, and pineapple    ¨ Grapes, raisins, and dates    ¨ Oranges, tangerines, grapefruit, orange juice, and grapefruit juice    ¨ Apricots, mangoes, melons, and papaya    ¨ Raspberries and strawberries    ¨ Canned fruit with no added sugar    · Low-fat dairy products:      ¨ Nonfat (skim) milk, 1% milk, and low-fat almond, cashew, or soy milks fortified with calcium    ¨ Low-fat cheese, regular or frozen yogurt, and cottage cheese    · Meats and proteins , such as lean cuts of beef and pork (loin, leg, round), skinless chicken and turkey, legumes, soy products, egg whites, and nuts  Foods and drinks to limit or avoid:  Ask your dietitian or healthcare provider about these and other foods that are high in unhealthy fat, sodium, and sugar:  · Snack or packaged foods , such as frozen dinners, cookies, macaroni and cheese, and cereals with more than 300 mg of sodium per serving    · Canned or dry mixes  for cakes, soups, sauces, or gravies    · Vegetables with added sodium , such as instant potatoes, vegetables with added sauces, or regular canned vegetables    · Other foods high in sodium , such as ketchup, barbecue sauce, salad dressing, pickles, olives, soy sauce, and miso    · High-fat dairy foods  such as whole or 2% milk, cream cheese, or sour cream, and cheeses     · High-fat protein foods  such as high-fat cuts of beef (T-bone steaks, ribs), chicken or turkey with skin, and organ meats, such as liver    · Cured or smoked meats , such as hot dogs, martinez, and sausage    · Unhealthy fats and oils , such as butter, stick margarine, shortening, and cooking oils such as coconut or palm oil    · Food and drinks high in sugar , such as soft drinks (soda), sports drinks, sweetened tea, candy, cake, cookies, pies, and doughnuts  Other diet guidelines to follow:   · Eat more foods containing omega-3 fats  Eat fish high in omega-3 fats at least 2 times a week  · Limit alcohol  Too much alcohol can damage your heart and raise your blood pressure  Women should limit alcohol to 1 drink a day  Men should limit alcohol to 2 drinks a day  A drink of alcohol is 12 ounces of beer, 5 ounces of wine, or 1½ ounces of liquor  · Choose low-sodium foods  High-sodium foods can lead to high blood pressure  Add little or no salt to food you prepare  Use herbs and spices in place of salt  · Eat more fiber  to help lower cholesterol levels  Eat at least 5 servings of fruits and vegetables each day  Eat 3 ounces of whole-grain foods each day  Legumes (beans) are also a good source of fiber  Lifestyle guidelines:   · Do not smoke  Nicotine and other chemicals in cigarettes and cigars can cause lung and heart damage  Ask your healthcare provider for information if you currently smoke and need help to quit  E-cigarettes or smokeless tobacco still contain nicotine  Talk to your healthcare provider before you use these products  · Exercise regularly  to help you maintain a healthy weight and improve your blood pressure and cholesterol levels  Ask your healthcare provider about the best exercise plan for you  Do not start an exercise program without asking your healthcare provider  Follow up with your healthcare provider as directed:  Write down your questions so you remember to ask them during your visits  © 2017 [address] Information is for End User's use only and may not be sold, redistributed or otherwise used for commercial purposes  All illustrations and images included in CareNotes® are the copyrighted property of [employer]  or Filippo Julien  The above information is an  only   It is not intended as medical advice for individual conditions or treatments  Talk to your doctor, nurse or pharmacist before following any medical regimen to see if it is safe and effective for you

## 2019-08-01 NOTE — TELEPHONE ENCOUNTER
Patient stated that he only received the 200 mg of levothyroxine, he stated that he called the pharmacy and they told him they only had one prescription     Can you send another script for the 50mg levothyroxine

## 2019-08-01 NOTE — TELEPHONE ENCOUNTER
Patient returned phone call and made him aware, patient verbalized understanding and was ok with that

## 2019-08-01 NOTE — TELEPHONE ENCOUNTER
SPOKE TO THE PHARMACIST AT [facility], HE SAID THE PT  PICKED UP A 90 SUPPLY OF  MCG IN MAY AND THEN PICKED UP THE 50 MCG July 12TH  HE SHOULD HAVE BOTH   MCG HAS REFILLS ON IT BUT IT IS TOO EARLY TO FILL       LEFT MESSAGE WITH FAMILY MEMBER TO HAVE HIM CALL BACK TO EXPLAIN

## 2019-08-07 ENCOUNTER — TELEPHONE (OUTPATIENT)
Dept: INTERNAL MEDICINE CLINIC | Facility: CLINIC | Age: 44
End: 2019-08-07

## 2019-08-07 NOTE — TELEPHONE ENCOUNTER
Called patient pharmacy [facility] in [facility] and spoke with the pharmacist  Per pharmacist patient in May 31,2019 he get a 90 days supply of the levothyroxine 50 mcg and in July 12, 2019 he get the new prescription of levothyroxine 200 mcg  Per pharmacist patient should be good with the levothyroxine 50 mg until august 30 or 31 first  Per pharmacist maybe patient is confused  Attempted made to contact patient to ask him and didn't answer  I will call him back at a later time

## 2019-08-07 NOTE — TELEPHONE ENCOUNTER
Called patient to remind him that he have an upcoming appt with Desiree Fong on 8/12/2019 at 2:20 pm and there's blood work to be completed prior his appt  Per patient he would go between Friday and Saturday to have it done but he's having problem with his levothyroxine because the pharmacy only give him the 200 mcg and not the 50 mcg too  Per patient he's only taking the 200 mcg  I will call pharmacy to verify about the 50 mcg levothyroxine

## 2019-08-09 ENCOUNTER — APPOINTMENT (OUTPATIENT)
Dept: LAB | Facility: HOSPITAL | Age: 44
End: 2019-08-09
Payer: COMMERCIAL

## 2019-08-09 DIAGNOSIS — E06.3 HYPOTHYROIDISM DUE TO HASHIMOTO'S THYROIDITIS: Chronic | ICD-10-CM

## 2019-08-09 DIAGNOSIS — R53.83 OTHER FATIGUE: ICD-10-CM

## 2019-08-09 DIAGNOSIS — E03.8 HYPOTHYROIDISM DUE TO HASHIMOTO'S THYROIDITIS: Chronic | ICD-10-CM

## 2019-08-09 LAB
T3 SERPL-MCNC: 0.7 NG/ML (ref 0.6–1.8)
T4 FREE SERPL-MCNC: 0.29 NG/DL (ref 0.76–1.46)
TSH SERPL DL<=0.05 MIU/L-ACNC: 76.5 UIU/ML (ref 0.36–3.74)

## 2019-08-09 PROCEDURE — 83520 IMMUNOASSAY QUANT NOS NONAB: CPT

## 2019-08-09 PROCEDURE — 84480 ASSAY TRIIODOTHYRONINE (T3): CPT | Performed by: PHYSICIAN ASSISTANT

## 2019-08-09 PROCEDURE — 84402 ASSAY OF FREE TESTOSTERONE: CPT

## 2019-08-09 PROCEDURE — 36415 COLL VENOUS BLD VENIPUNCTURE: CPT | Performed by: PHYSICIAN ASSISTANT

## 2019-08-09 PROCEDURE — 84439 ASSAY OF FREE THYROXINE: CPT

## 2019-08-09 PROCEDURE — 84403 ASSAY OF TOTAL TESTOSTERONE: CPT

## 2019-08-09 PROCEDURE — 84443 ASSAY THYROID STIM HORMONE: CPT

## 2019-08-10 LAB
TESTOST FREE SERPL-MCNC: 9.9 PG/ML (ref 6.8–21.5)
TESTOST SERPL-MCNC: 157 NG/DL (ref 264–916)

## 2019-08-12 ENCOUNTER — OFFICE VISIT (OUTPATIENT)
Dept: INTERNAL MEDICINE CLINIC | Facility: CLINIC | Age: 44
End: 2019-08-12

## 2019-08-12 VITALS
HEART RATE: 72 BPM | SYSTOLIC BLOOD PRESSURE: 116 MMHG | DIASTOLIC BLOOD PRESSURE: 80 MMHG | WEIGHT: 298.94 LBS | BODY MASS INDEX: 42.8 KG/M2 | TEMPERATURE: 98.4 F | HEIGHT: 70 IN

## 2019-08-12 DIAGNOSIS — E11.65 UNCONTROLLED TYPE 2 DIABETES MELLITUS WITH HYPERGLYCEMIA (HCC): ICD-10-CM

## 2019-08-12 DIAGNOSIS — E78.2 MIXED HYPERLIPIDEMIA: ICD-10-CM

## 2019-08-12 DIAGNOSIS — E06.3 HYPOTHYROIDISM DUE TO HASHIMOTO'S THYROIDITIS: Primary | Chronic | ICD-10-CM

## 2019-08-12 DIAGNOSIS — E03.8 HYPOTHYROIDISM DUE TO HASHIMOTO'S THYROIDITIS: Primary | Chronic | ICD-10-CM

## 2019-08-12 DIAGNOSIS — E66.01 CLASS 3 SEVERE OBESITY DUE TO EXCESS CALORIES WITH SERIOUS COMORBIDITY AND BODY MASS INDEX (BMI) OF 40.0 TO 44.9 IN ADULT (HCC): ICD-10-CM

## 2019-08-12 DIAGNOSIS — R79.89 LOW TESTOSTERONE: Chronic | ICD-10-CM

## 2019-08-12 DIAGNOSIS — Z82.49 FAMILY HISTORY OF MYOCARDIAL INFARCTION AT AGE LESS THAN 60: Chronic | ICD-10-CM

## 2019-08-12 DIAGNOSIS — G47.33 OBSTRUCTIVE SLEEP APNEA: ICD-10-CM

## 2019-08-12 PROCEDURE — 99214 OFFICE O/P EST MOD 30 MIN: CPT | Performed by: PHYSICIAN ASSISTANT

## 2019-08-12 PROCEDURE — 3008F BODY MASS INDEX DOCD: CPT | Performed by: PHYSICIAN ASSISTANT

## 2019-08-12 PROCEDURE — 3725F SCREEN DEPRESSION PERFORMED: CPT | Performed by: PHYSICIAN ASSISTANT

## 2019-08-12 RX ORDER — LEVOTHYROXINE SODIUM 75 MCG
75 TABLET ORAL DAILY
Qty: 90 TABLET | Refills: 0 | Status: SHIPPED | OUTPATIENT
Start: 2019-08-12 | End: 2019-11-04

## 2019-08-12 RX ORDER — LEVOTHYROXINE SODIUM 0.2 MG/1
200 TABLET ORAL DAILY
Qty: 90 TABLET | Refills: 0 | Status: SHIPPED | OUTPATIENT
Start: 2019-08-12 | End: 2019-11-04

## 2019-08-12 RX ORDER — ATORVASTATIN CALCIUM 40 MG/1
40 TABLET, FILM COATED ORAL DAILY
Qty: 90 TABLET | Refills: 1 | Status: SHIPPED | OUTPATIENT
Start: 2019-08-12 | End: 2020-02-08

## 2019-08-12 NOTE — TELEPHONE ENCOUNTER
Patient here today 8/12/2019 for an follow up appt with Vielka Kim and has been informed  Patient verbally understood

## 2019-08-12 NOTE — PATIENT INSTRUCTIONS
As per our discussion we are increasing your thyroid medication  We did review that I did request brand medically necessary so unsure why they gave you the levothyroxine version  You will now take Synthroid 200 mcg plus Synthroid 75 mcg for a total of 275 mcg daily  Referral for Endo has also been placed  We discussed that I am Increasing your cholesterol medication to atorvastatin 40 mg 1 tablet daily  Script provided for thyroid blood test as dated and your routine follow-up for cholesterol and diabetes as dated in 6 months  We also contacted your pharmacy to make sure that they give you Synthroid and not levothyroxine  the pharmacist did mention they do not have it in stock but should have it tomorrow  Please go tomorrow to  all of your prescriptions  We did review that with your under active thyroid it does make it slightly more challenging to have meaningful weight loss as well as your sleep apnea  That being said you still need to make improvements in her diet and increase her physical activity and exercise  This includes monitoring total calories per day as well as limiting low quality food choices such as sweets and simple carbohydrates and improving to whole wheat whole grains and better protein choices  Also reviewed avoiding greasy fried and fast foods as well as sugary drinks  You already have an appointment scheduled in September with your urologist and will also discuss your low testosterone level  You confirm you still have to call the sleep center to schedule your titration study for your sleep apnea  Based on your family history of both of your brothers having heart attacks age 39 and her father also having significant cardiac history referral for the cardiologist was also placed

## 2019-08-12 NOTE — PROGRESS NOTES
Assessment/Plan:    As per our discussion we are increasing your thyroid medication  We did review that I did request brand medically necessary so unsure why they gave you the levothyroxine version  You will now take Synthroid 200 mcg plus Synthroid 75 mcg for a total of 275 mcg daily  Referral for Endo has also been placed  We discussed that I am Increasing your cholesterol medication to atorvastatin 40 mg 1 tablet daily  Script provided for thyroid blood test as dated and your routine follow-up for cholesterol and diabetes as dated in 6 months  We also contacted your pharmacy to make sure that they give you Synthroid and not levothyroxine  the pharmacist did mention they do not have it in stock but should have it tomorrow  Please go tomorrow to  all of your prescriptions  We did review that with your under active thyroid it does make it slightly more challenging to have meaningful weight loss as well as your sleep apnea  That being said you still need to make improvements in her diet and increase her physical activity and exercise  This includes monitoring total calories per day as well as limiting low quality food choices such as sweets and simple carbohydrates and improving to whole wheat whole grains and better protein choices  Also reviewed avoiding greasy fried and fast foods as well as sugary drinks  You already have an appointment scheduled in September with your urologist and will also discuss your low testosterone level  You confirm you still have to call the sleep center to schedule your titration study for your sleep apnea  Based on your family history of both of your brothers having heart attacks age 39 and her father also having significant cardiac history referral for the cardiologist was also placed  No problem-specific Assessment & Plan notes found for this encounter         Diagnoses and all orders for this visit:    Hypothyroidism due to Hashimoto's thyroiditis  - SYNTHROID 75 MCG tablet; Take 1 tablet (75 mcg total) by mouth daily for 90 days  -     levothyroxine (SYNTHROID) 200 mcg tablet; Take 1 tablet (200 mcg total) by mouth daily for 90 days  -     Ambulatory referral to Endocrinology; Future  -     T4, free; Future  -     TSH, 3rd generation; Future    Mixed hyperlipidemia  -     atorvastatin (LIPITOR) 40 mg tablet; Take 1 tablet (40 mg total) by mouth daily for 180 days    Low testosterone    Obstructive sleep apnea    Family history of myocardial infarction at age less than 61  -     Ambulatory referral to Cardiology; Future    Uncontrolled type 2 diabetes mellitus with hyperglycemia (HCC)  -     Comprehensive metabolic panel; Future  -     Hemoglobin A1C; Future  -     Lipid Panel with Direct LDL reflex; Future  -     Microalbumin / creatinine urine ratio; Future    Class 3 severe obesity due to excess calories with serious comorbidity and body mass index (BMI) of 40 0 to 44 9 in [facility])          Subjective:      Patient ID: Alonso Hill is a 45 y o  male  Pt here for lab review  Did get higher dose of levothyroxine but never got the brand name even though written as brand medically necessary  Advised patient that his thyroid function did not change even with increasing to the 250 mcg even though he never got the brand medically necessary Synthroid version  Discussed will increase him to 275 mcg once again wrote as brand medically necessary and we contacted his pharmacy before he left to advise them of this  They did report however that they do not have Synthroid in stock they would place the order and would be available tomorrow  Patient is requesting referral to Cardiology    States one brother passed from MI the other is alive but both had MI age 39 and strong FH on both sides    Sometimes CP states never with walking rarely at rest   And reports that it is more pressure that is underneath his left arm and not actually in his chest   That being said with his significantly elevated cholesterol and family history referral to cardiology is appropriate  Also reviewed with patient that his cholesterol did not improve even though some of it is being affected slightly by his hypothyroidism I am increasing his atorvastatin to 40 mg daily  Thyroid ultrasound for follow-up shows no nodules  Slightly increased in size however your thyroid has not been controlled in some time and this would be expected  Patient also confirms he still has not call the sleep center to schedule his titration study even though we discussed and was reminded at his last visit  Also reviewed with patient that his testosterone level is low however he already has an appointment with the urologist in September to follow-up on his hydrocele and they will further discuss follow-up testing and management for his low testosterone  I have also placed an order for referral to endocrinology as despite multiple medication doses and changes his thyroid function remains below normal as noted attempted but pharmacy did not fill correctly to Synthroid only  Patient will do labs next month but order for endocrinology has been placed in the meantime as well  The following portions of the patient's history were reviewed and updated as appropriate: allergies, current medications, past family history, past medical history, past social history, past surgical history and problem list     Review of Systems   Constitutional: Negative  Negative for activity change, appetite change, chills, fever and unexpected weight change  HENT: Negative  Eyes: Negative  Negative for visual disturbance  Respiratory: Negative  Negative for cough, chest tightness and shortness of breath  Cardiovascular: Positive for chest pain (As noted in HPI patient has experienced some chest pressure at rest but never with activity and denies pain)  Negative for palpitations  Gastrointestinal: Negative    Negative for abdominal pain, constipation, diarrhea and nausea  Endocrine: Negative  Negative for cold intolerance, heat intolerance, polydipsia, polyphagia and polyuria  Genitourinary: Negative  Negative for frequency and urgency  Musculoskeletal: Negative  Skin: Negative  Negative for rash  Neurological: Negative  Negative for tremors and numbness  Psychiatric/Behavioral: Positive for sleep disturbance  Negative for decreased concentration  The patient is not nervous/anxious  Objective:      /80 (BP Location: Left arm, Patient Position: Sitting, Cuff Size: Large)   Pulse 72   Temp 98 4 °F (36 9 °C) (Oral)   Ht 5' 9 5" (1 765 m)   Wt 136 kg (298 lb 15 1 oz)   BMI 43 51 kg/m²          Physical Exam   Constitutional: He is oriented to person, place, and time  He appears well-developed and well-nourished  No distress  HENT:   Head: Normocephalic  Eyes: Pupils are equal, round, and reactive to light  Conjunctivae are normal    Neck: Normal range of motion  No JVD present  No tracheal deviation present  Thyromegaly present  Cardiovascular: Normal rate, regular rhythm, normal heart sounds and intact distal pulses  No murmur heard  Pulmonary/Chest: Effort normal and breath sounds normal    Abdominal: Soft  Bowel sounds are normal    Exam is limited by body habitus  Musculoskeletal: He exhibits no edema  Neurological: He is alert and oriented to person, place, and time  Skin: Skin is warm and dry  No rash noted  Psychiatric: He has a normal mood and affect  His behavior is normal  Thought content normal    Nursing note and vitals reviewed        PHQ-9 Depression Screening    PHQ-9:    Frequency of the following problems over the past two weeks:       Little interest or pleasure in doing things:  0 - not at all  Feeling down, depressed, or hopeless:  0 - not at all  PHQ-2 Score:  0

## 2019-08-16 LAB — HUMAN ANTIMOUSE AB SER-MCNC: <56 NG/ML (ref 0–74)

## 2019-08-29 NOTE — PROGRESS NOTES
Cardiology Consultation     Radha Novoa  [id number]  1975  [facility] [address] [place] [address]      1  Dyspnea on exertion  Echo stress test w contrast if indicated   2  Dyslipidemia  fenofibrate (TRICOR) 145 mg tablet   3  Uncontrolled type 2 diabetes mellitus with hyperglycemia ([facility] [address] )     4  Obstructive sleep apnea     5  Class 3 severe obesity due to excess calories with serious comorbidity and body mass index (BMI) of 40 0 to 44 9 in adult ([facility] [address] )     6  Family history of myocardial infarction at age less than 61  Ambulatory referral to Cardiology    POCT ECG       Discussion/Summary:  Mr Vinod Smith is a pleasant 82-year-old gentleman who presents to the office today to establish care given his family history of coronary artery disease  He has no personal history of coronary disease but multiple risk factors and has reported some shortness of breath with exertion in the recent past   Because of this I have asked that he undergo a stress echocardiogram for further evaluation  His blood pressure is well controlled in the office today on his current regimen  He is maintained on statin therapy in the setting of his diabetes although his LDL remains sub-optimal   However he is hypothyroid with a TSH of 64  His Synthroid dose was recently adjusted by his primary care provider  Once his TSH normalizes his lipids can be reassessed and if his LDL remains suboptimal he will need escalation of his statin regimen  Nonetheless his triglyceride level is 934  It has been extremely elevated and over 500 since 2015  I have advised the addition of fenofibrate to his medication regimen  His lipids will be reassessed in the near future  He was encouraged to schedule a CPAP titration study for his obstructive sleep apnea  Smoking cessation is strongly advised    He is attempting to cut back with the intent to quit by the end of the year  Weight loss is also imperative and this was discussed with the patient  He admits to eating a high fat diet and eats at fast food restaurants on a daily basis  He is contemplating seeing a nutritionist which I encouraged  Follow-up will be arranged in six months or sooner if deemed necessary  History of Present Illness:  Mr Lulu Snellen is a very pleasant 74-year-old gentleman who presents to the office today to establish care given his family history of coronary artery disease  He carries no personal history of coronary artery disease  In the last three months he has noted some issues with shortness of breath with exertion  He plays softball on a regular basis and notes that in doing so, ascending a flight of steps, or walking more than 4 to 5 blocks on flat ground he will become short of breath  He recovers within a few minutes of rest   He does not notice any chest pain with exertion  He has noted a pins and needles and heaviness sensation in his left arm when he performs activities such as carrying groceries or while driving  His symptoms resolve when he stops these activities  He does note some lower extremity edema which is worse in extreme weather or when he is on his feet for long periods of time  He denies any paroxysmal nocturnal dyspnea or orthopnea  He denies acute weight gain or increasing abdominal girth  He denies lightheadedness, syncope or presyncope  He denies palpitations or symptoms of claudication  He does carry the diagnosis of obstructive sleep apnea and is scheduled to return to the sleep lab for CPAP titration  He admits to a diet high in fat  He eats at [employer] daily  He currently smokes about a quarter of pack of cigarettes per day    He has been smoking since he was a teenager and smoked about a pack of cigarettes per day at his heaviest     His mother is alive with history of CAD having undergone CABG at the age of 58   His father had CAD and underwent CABG in his 76s  His brother is 39 and recently had a stroke  His other brother  suddenly at the age of 39 with an autopsy confirming coronary disease as the cause        Patient Active Problem List   Diagnosis    Dyslipidemia    Hypothyroidism due to Hashimoto's thyroiditis    Non-toxic multinodular goiter    Other hydrocele    Uncontrolled type 2 diabetes mellitus with hyperglycemia (HCC)    Class 3 severe obesity due to excess calories with serious comorbidity and body mass index (BMI) of 40 0 to 44 9 in [facility])    Other fatigue    Obstructive sleep apnea    Tinea corporis    Low testosterone    Family history of myocardial infarction at age less than 61    Dyspnea on exertion     Past Medical History:   Diagnosis Date    Deformity of rib     Last assessed: 2015    Diabetes mellitus ([facility] [address] )     Family history of thyroid problem     Hydrocele     Hypertension     Neutrophilia     Last Assessed - 2015    Sleep apnea      Social History     Socioeconomic History    Marital status: Single     Spouse name: Not on file    Number of children: Not on file    Years of education: Not on file    Highest education level: Not on file   Occupational History    Not on file   Social Needs    Financial resource strain: Not on file    Food insecurity:     Worry: Not on file     Inability: Not on file    Transportation needs:     Medical: Not on file     Non-medical: Not on file   Tobacco Use    Smoking status: Current Every Day Smoker     Packs/day: 0 25     Years: 15 00     Pack years: 3 75    Smokeless tobacco: Never Used    Tobacco comment: Patient is trying to quit   Substance and Sexual Activity    Alcohol use: Yes     Comment: Socially    Drug use: Yes     Types: Marijuana    Sexual activity: Yes     Partners: Female     Birth control/protection: None   Lifestyle    Physical activity:     Days per week: Not on file     Minutes per session: Not on file    Stress: Not on file   Relationships    Social connections:     Talks on phone: Not on file     Gets together: Not on file     Attends Buddhist service: Not on file     Active member of club or organization: Not on file     Attends meetings of clubs or organizations: Not on file     Relationship status: Not on file    Intimate partner violence:     Fear of current or ex partner: Not on file     Emotionally abused: Not on file     Physically abused: Not on file     Forced sexual activity: Not on file   Other Topics Concern    Not on file   Social History Narrative    Not on file      Family History   Problem Relation Age of Onset    Diabetes Mother     Hypertension Mother     Thyroid disease Mother         hypothyroidism    Heart disease Mother     Hyperlipidemia Mother     Hyperlipidemia Father     Hypertension Father     Heart disease Father     Heart attack Brother     Hypertension Other     Diabetes Other      Past Surgical History:   Procedure Laterality Date    TOOTH EXTRACTION  07/12/2018       Current Outpatient Medications:     atorvastatin (LIPITOR) 40 mg tablet, Take 1 tablet (40 mg total) by mouth daily for 180 days, Disp: 90 tablet, Rfl: 1    clotrimazole-betamethasone (LOTRISONE) 1-0 05 % cream, Apply to affected area 2 times daily, Disp: 45 g, Rfl: 0    ketoconazole (NIZORAL) 2 % cream, Apply topically daily for 30 days, Disp: 30 g, Rfl: 1    levothyroxine (SYNTHROID) 200 mcg tablet, Take 1 tablet (200 mcg total) by mouth daily for 90 days, Disp: 90 tablet, Rfl: 0    metFORMIN (GLUCOPHAGE) 1000 MG tablet, Take 1 tablet (1,000 mg total) by mouth 2 (two) times a day with meals for 180 days, Disp: 180 tablet, Rfl: 1    SYNTHROID 75 MCG tablet, Take 1 tablet (75 mcg total) by mouth daily for 90 days, Disp: 90 tablet, Rfl: 0    fenofibrate (TRICOR) 145 mg tablet, Take 1 tablet (145 mg total) by mouth daily, Disp: 90 tablet, Rfl: 3  No Known Allergies      Labs:  Appointment on 08/09/2019   Component Date Value    Free T4 08/09/2019 0 29*    TSH 3RD GENERATON 08/09/2019 76 500*    HUMAN ANTI-MOUSE ANTIBOD* 08/09/2019 <56     Testosterone, Free 08/09/2019 9 9     TESTOSTERONE TOTAL 08/09/2019 157*   Office Visit on 07/12/2019   Component Date Value    T3, Total 08/09/2019 0 70    Appointment on 07/11/2019   Component Date Value    Free T4 07/11/2019 0 29*    TSH 3RD Childs Juan Jose 07/11/2019 64 000*    Hemoglobin A1C 07/11/2019 7 1*    EAG 07/11/2019 157     Cholesterol 07/11/2019 270*    Triglycerides 07/11/2019 934*    HDL, Direct 07/11/2019 31*    LDL Calculated 07/11/2019      LDL Direct 07/11/2019 116*        ECG: Normal sinus rhythm, normal ECG    Review of Systems:  Review of Systems   Respiratory: Positive for shortness of breath  Cardiovascular: Positive for leg swelling  All other systems reviewed and are negative          Vitals:    08/30/19 1254   BP: 126/78   BP Location: Right arm   Patient Position: Sitting   Cuff Size: Large   Pulse: 67   Weight: 135 kg (298 lb)   Height: 5' 9" (1 753 m)     Vitals:    08/30/19 1254   Weight: 135 kg (298 lb)     Height: 5' 9" (175 3 cm)     Physical Exam:  General appearance:  Appears stated age, alert, well appearing and in no distress  HEENT:  PERRLA, EOMI, no scleral icterus, no conjunctival pallor  NECK:  Supple, No elevated JVP, no thyromegaly, no carotid bruits  HEART:  Regular rate and rhythm, normal S1/S2, no S3/S4, no murmur or rub  LUNGS:  Clear to auscultation bilaterally  ABDOMEN:  Soft, non-tender, positive bowel sounds, no rebound or guarding, no organomegaly   EXTREMITIES:  No edema  VASCULAR:  Normal pedal pulses   SKIN: No lesions or rashes on exposed skin  NEURO:  CN II-XII intact, no focal deficits

## 2019-08-30 ENCOUNTER — CONSULT (OUTPATIENT)
Dept: CARDIOLOGY CLINIC | Facility: CLINIC | Age: 44
End: 2019-08-30
Payer: COMMERCIAL

## 2019-08-30 VITALS
DIASTOLIC BLOOD PRESSURE: 78 MMHG | BODY MASS INDEX: 44.14 KG/M2 | HEART RATE: 67 BPM | HEIGHT: 69 IN | SYSTOLIC BLOOD PRESSURE: 126 MMHG | WEIGHT: 298 LBS

## 2019-08-30 DIAGNOSIS — E11.65 UNCONTROLLED TYPE 2 DIABETES MELLITUS WITH HYPERGLYCEMIA (HCC): ICD-10-CM

## 2019-08-30 DIAGNOSIS — E78.5 DYSLIPIDEMIA: ICD-10-CM

## 2019-08-30 DIAGNOSIS — G47.33 OBSTRUCTIVE SLEEP APNEA: ICD-10-CM

## 2019-08-30 DIAGNOSIS — E66.01 CLASS 3 SEVERE OBESITY DUE TO EXCESS CALORIES WITH SERIOUS COMORBIDITY AND BODY MASS INDEX (BMI) OF 40.0 TO 44.9 IN ADULT (HCC): ICD-10-CM

## 2019-08-30 DIAGNOSIS — Z82.49 FAMILY HISTORY OF MYOCARDIAL INFARCTION AT AGE LESS THAN 60: Chronic | ICD-10-CM

## 2019-08-30 DIAGNOSIS — R06.00 DYSPNEA ON EXERTION: Primary | ICD-10-CM

## 2019-08-30 PROBLEM — R06.09 DYSPNEA ON EXERTION: Status: ACTIVE | Noted: 2019-08-30

## 2019-08-30 PROCEDURE — 93000 ELECTROCARDIOGRAM COMPLETE: CPT | Performed by: INTERNAL MEDICINE

## 2019-08-30 PROCEDURE — 99244 OFF/OP CNSLTJ NEW/EST MOD 40: CPT | Performed by: INTERNAL MEDICINE

## 2019-08-30 RX ORDER — FENOFIBRATE 145 MG/1
145 TABLET, COATED ORAL DAILY
Qty: 90 TABLET | Refills: 3 | Status: SHIPPED | OUTPATIENT
Start: 2019-08-30

## 2019-09-13 ENCOUNTER — OFFICE VISIT (OUTPATIENT)
Dept: UROLOGY | Facility: AMBULATORY SURGERY CENTER | Age: 44
End: 2019-09-13
Payer: COMMERCIAL

## 2019-09-13 VITALS
HEART RATE: 56 BPM | SYSTOLIC BLOOD PRESSURE: 124 MMHG | DIASTOLIC BLOOD PRESSURE: 80 MMHG | HEIGHT: 69 IN | BODY MASS INDEX: 44.28 KG/M2 | WEIGHT: 299 LBS

## 2019-09-13 DIAGNOSIS — N43.3 HYDROCELE, UNSPECIFIED HYDROCELE TYPE: Primary | ICD-10-CM

## 2019-09-13 PROCEDURE — 99213 OFFICE O/P EST LOW 20 MIN: CPT | Performed by: NURSE PRACTITIONER

## 2019-09-13 NOTE — PATIENT INSTRUCTIONS
Continue with scrotal immobilization and elevation  Monitor progress- if it worsens consider having hydrocelectomy

## 2019-09-13 NOTE — PROGRESS NOTES
9/13/2019    Chief Complaint   Patient presents with    Hydrocele     Assessment and Plan    45 y o  male managed by Dr Travis Zuluaga    1  Hydrocele  · Continue with scrotal immobilization and elevation  · Continue to monitor progression of resolution  · Discussed Hydrocelectomy- pt deferred at this time due to slight resolution and less discomfort  · Follow up in 6 months    History of Present Illness  lJ Huff is a 45 y o  male here for follow up evaluation of  Right sided hydrocele  Patient presents to the office today stating that his hydrocele has slightly resolved in that is less swollen but has not passed  He currently has no urinary complaints and is not significantly bothered by the increased testicular swelling  At last office visit discussion took place regarding elective hydrocelectomy  At that time patient deferred treatment  He continues to defer treatment today secondary to resolution swelling and less discomfort  At this office visit we revisited the conversation and explained surgical procedure to treat hydrocele  Patient will performed and states that he will continue to watch for resolution of hydrocele  In the event that the hydrocele were since he will consider surgical intervention at that time  Review of Systems   Constitutional: Negative for chills and fever  Respiratory: Negative for cough and shortness of breath  Cardiovascular: Negative for chest pain  Gastrointestinal: Negative for abdominal distention, abdominal pain, blood in stool, nausea and vomiting  Genitourinary: Negative for difficulty urinating, dysuria, enuresis, flank pain, frequency, hematuria and urgency  Musculoskeletal: Negative for back pain  Skin: Negative for rash  Neurological: Negative for dizziness  Urinary Incontinence Screening      Most Recent Value   Urinary Incontinence   Urinary Incontinence? No   Incomplete emptying? No   Urinary frequency? No   Urinary urgency?   No Urinary hesitancy? No   Dysuria (painful difficult urination)? No   Nocturia (waking up to use the bathroom)? No   Straining (having to push to go)? No   Weak stream?  No   Intermittent stream?  No   Post void dribbling?   No          Past Medical History  Past Medical History:   Diagnosis Date    Deformity of rib     Last assessed: 03 Nov 2015    Diabetes mellitus ([address] )     Family history of thyroid problem     Hydrocele     Hypertension     Neutrophilia     Last Assessed - 03 Nov 2015    Sleep apnea        Past Social History  Past Surgical History:   Procedure Laterality Date    TOOTH EXTRACTION  07/12/2018     Social History     Tobacco Use   Smoking Status Current Every Day Smoker    Packs/day: 0 25    Years: 15 00    Pack years: 3 75   Smokeless Tobacco Never Used   Tobacco Comment    Patient is trying to quit       Past Family History  Family History   Problem Relation Age of Onset    Diabetes Mother     Hypertension Mother     Thyroid disease Mother         hypothyroidism    Heart disease Mother     Hyperlipidemia Mother     Hyperlipidemia Father     Hypertension Father     Heart disease Father     Heart attack Brother     Hypertension Other     Diabetes Other        Past Social history  Social History     Socioeconomic History    Marital status: Single     Spouse name: Not on file    Number of children: Not on file    Years of education: Not on file    Highest education level: Not on file   Occupational History    Not on file   Social Needs    Financial resource strain: Not on file    Food insecurity:     Worry: Not on file     Inability: Not on file    Transportation needs:     Medical: Not on file     Non-medical: Not on file   Tobacco Use    Smoking status: Current Every Day Smoker     Packs/day: 0 25     Years: 15 00     Pack years: 3 75    Smokeless tobacco: Never Used    Tobacco comment: Patient is trying to quit   Substance and Sexual Activity    Alcohol use: Yes     Comment: Socially    Drug use: Yes     Types: Marijuana    Sexual activity: Yes     Partners: Female     Birth control/protection: None   Lifestyle    Physical activity:     Days per week: Not on file     Minutes per session: Not on file    Stress: Not on file   Relationships    Social connections:     Talks on phone: Not on file     Gets together: Not on file     Attends Yazdanism service: Not on file     Active member of club or organization: Not on file     Attends meetings of clubs or organizations: Not on file     Relationship status: Not on file    Intimate partner violence:     Fear of current or ex partner: Not on file     Emotionally abused: Not on file     Physically abused: Not on file     Forced sexual activity: Not on file   Other Topics Concern    Not on file   Social History Narrative    Not on file       Current Medications  Current Outpatient Medications   Medication Sig Dispense Refill    atorvastatin (LIPITOR) 40 mg tablet Take 1 tablet (40 mg total) by mouth daily for 180 days 90 tablet 1    fenofibrate (TRICOR) 145 mg tablet Take 1 tablet (145 mg total) by mouth daily 90 tablet 3    levothyroxine (SYNTHROID) 200 mcg tablet Take 1 tablet (200 mcg total) by mouth daily for 90 days 90 tablet 0    metFORMIN (GLUCOPHAGE) 1000 MG tablet Take 1 tablet (1,000 mg total) by mouth 2 (two) times a day with meals for 180 days 180 tablet 1    SYNTHROID 75 MCG tablet Take 1 tablet (75 mcg total) by mouth daily for 90 days 90 tablet 0    clotrimazole-betamethasone (LOTRISONE) 1-0 05 % cream Apply to affected area 2 times daily 45 g 0    ketoconazole (NIZORAL) 2 % cream Apply topically daily for 30 days 30 g 1     No current facility-administered medications for this visit          Allergies  No Known Allergies      The following portions of the patient's history were reviewed and updated as appropriate: allergies, current medications, past medical history, past social history, past surgical history and problem list       Vitals  Vitals:    09/13/19 0958   BP: 124/80   Pulse: 56   Weight: 136 kg (299 lb)   Height: 5' 9" (1 753 m)     Physical Exam  Physical Exam   Constitutional: He is oriented to person, place, and time  He appears well-developed and well-nourished  HENT:   Head: Atraumatic  Eyes: EOM are normal    Neck: Neck supple  Genitourinary: Penis normal  Right testis shows swelling  Left testis shows no swelling and no tenderness  Musculoskeletal: Normal range of motion  Neurological: He is alert and oriented to person, place, and time  Skin: Skin is warm and dry  Psychiatric: He has a normal mood and affect  Vitals reviewed  Results  No results found for this or any previous visit (from the past 1 hour(s))  ]  No results found for: PSA  Lab Results   Component Value Date    GLUCOSE 110 10/28/2015    CALCIUM 8 5 07/11/2019     10/28/2015    K 4 1 07/11/2019    CO2 28 07/11/2019     07/11/2019    BUN 10 07/11/2019    CREATININE 1 04 07/11/2019     Lab Results   Component Value Date    WBC 14 98 (H) 10/28/2015    HGB 14 5 10/28/2015    HCT 43 1 10/28/2015    MCV 91 10/28/2015     10/28/2015     Orders  No orders of the defined types were placed in this encounter        TUNDE Ramirez

## 2019-10-08 ENCOUNTER — HOSPITAL ENCOUNTER (OUTPATIENT)
Dept: NON INVASIVE DIAGNOSTICS | Facility: CLINIC | Age: 44
Discharge: HOME | End: 2019-10-08
Payer: COMMERCIAL

## 2019-10-08 ENCOUNTER — APPOINTMENT (OUTPATIENT)
Dept: LAB | Facility: CLINIC | Age: 44
End: 2019-10-08
Payer: COMMERCIAL

## 2019-10-08 DIAGNOSIS — E03.8 HYPOTHYROIDISM DUE TO HASHIMOTO'S THYROIDITIS: Chronic | ICD-10-CM

## 2019-10-08 DIAGNOSIS — E06.3 HYPOTHYROIDISM DUE TO HASHIMOTO'S THYROIDITIS: Chronic | ICD-10-CM

## 2019-10-08 DIAGNOSIS — R06.00 DYSPNEA ON EXERTION: ICD-10-CM

## 2019-10-08 LAB
CHEST PAIN STATEMENT: NORMAL
MAX DIASTOLIC BP: 88 MMHG
MAX HEART RATE: 181 BPM
MAX PREDICTED HEART RATE: 178 BPM
MAX. SYSTOLIC BP: 174 MMHG
PROTOCOL NAME: NORMAL
T4 FREE SERPL-MCNC: 0.33 NG/DL (ref 0.76–1.46)
TARGET HR FORMULA: NORMAL
TEST INDICATION: NORMAL
TIME IN EXERCISE PHASE: NORMAL
TSH SERPL DL<=0.05 MIU/L-ACNC: 61.9 UIU/ML (ref 0.36–3.74)

## 2019-10-08 PROCEDURE — 84439 ASSAY OF FREE THYROXINE: CPT

## 2019-10-08 PROCEDURE — 84443 ASSAY THYROID STIM HORMONE: CPT

## 2019-10-08 PROCEDURE — 93350 STRESS TTE ONLY: CPT

## 2019-10-08 PROCEDURE — 36415 COLL VENOUS BLD VENIPUNCTURE: CPT

## 2019-10-08 PROCEDURE — 93351 STRESS TTE COMPLETE: CPT | Performed by: INTERNAL MEDICINE

## 2019-11-04 DIAGNOSIS — E06.3 HYPOTHYROIDISM DUE TO HASHIMOTO'S THYROIDITIS: Primary | Chronic | ICD-10-CM

## 2019-11-04 DIAGNOSIS — E03.8 HYPOTHYROIDISM DUE TO HASHIMOTO'S THYROIDITIS: Primary | Chronic | ICD-10-CM

## 2019-11-04 RX ORDER — LEVOTHYROXINE SODIUM 300 UG/1
300 TABLET ORAL DAILY
Qty: 90 TABLET | Refills: 0 | Status: SHIPPED | OUTPATIENT
Start: 2019-11-04 | End: 2020-02-02

## 2020-04-02 ENCOUNTER — TELEPHONE (OUTPATIENT)
Dept: UROLOGY | Facility: AMBULATORY SURGERY CENTER | Age: 45
End: 2020-04-02

## 2022-08-30 NOTE — TELEPHONE ENCOUNTER
Called patient and has been reminded that he have an appt on 7/12/2019 at 2:00 pm with Rebecca Mauricio and there's blood work to be completed prior his appt  Per patient he's going tomorrow morning to have it done  Detail Level: Simple Detail Level: Zone Detail Level: Generalized Patient Specific Counseling (Will Not Stick From Patient To Patient): ** pt planning to start imiquimod treatment in the fall/winter, advised to follow up one month post treatment. Detail Level: Detailed

## 2023-09-05 ENCOUNTER — HOSPITAL ENCOUNTER (EMERGENCY)
Facility: HOSPITAL | Age: 48
Discharge: HOME | End: 2023-09-05
Attending: EMERGENCY MEDICINE
Payer: MEDICARE

## 2023-09-05 VITALS
SYSTOLIC BLOOD PRESSURE: 154 MMHG | DIASTOLIC BLOOD PRESSURE: 86 MMHG | HEART RATE: 68 BPM | OXYGEN SATURATION: 96 % | RESPIRATION RATE: 18 BRPM | TEMPERATURE: 98 F

## 2023-09-05 DIAGNOSIS — L03.90 CELLULITIS: Primary | ICD-10-CM

## 2023-09-05 PROCEDURE — 99284 EMERGENCY DEPT VISIT MOD MDM: CPT | Performed by: EMERGENCY MEDICINE

## 2023-09-05 PROCEDURE — 99282 EMERGENCY DEPT VISIT SF MDM: CPT

## 2023-09-05 RX ORDER — CEPHALEXIN 500 MG/1
500 CAPSULE ORAL EVERY 6 HOURS SCHEDULED
Qty: 20 CAPSULE | Refills: 0 | Status: SHIPPED | OUTPATIENT
Start: 2023-09-05 | End: 2023-09-10

## 2023-09-05 RX ORDER — CEPHALEXIN 500 MG/1
500 CAPSULE ORAL ONCE
Status: COMPLETED | OUTPATIENT
Start: 2023-09-05 | End: 2023-09-05

## 2023-09-05 RX ADMIN — CEPHALEXIN 500 MG: 500 CAPSULE ORAL at 12:31

## 2023-09-05 NOTE — Clinical Note
Benja De La Cruzipes was seen and treated in our emergency department on 9/5/2023. Diagnosis:     Mando  . He may return on this date: 09/06/2023         If you have any questions or concerns, please don't hesitate to call.       Nathan Solomon MD    ______________________________           _______________          _______________  Hospital Representative                              Date                                Time

## 2023-09-05 NOTE — DISCHARGE INSTRUCTIONS
Thank you for coming to the ED for your care. We would like you to take antibiotics to help with what appears to be infection of the skin of your legs. We would like you to keep a close eye on the areas of redness because if they continue to expand while on the antibiotics then this means you may need a change of antibiotics. Please read the attached for further guidance in home care and reasons to return to the ED.

## 2023-09-05 NOTE — Clinical Note
Amado Patrick was seen and treated in our emergency department on 9/5/2023. Diagnosis:     Mando  . He may return on this date: 09/06/2023         If you have any questions or concerns, please don't hesitate to call.       Dom Carbajal MD    ______________________________           _______________          _______________  Hospital Representative                              Date                                Time

## 2023-09-05 NOTE — ED PROVIDER NOTES
History  Chief Complaint   Patient presents with   • Cellulitis     Pt presents with redness and warmth to bilateral legs that he noticed this morning      26-year-old male with history of diabetes, lower extremity edema that is chronic, presents emergency department due to redness and tenderness of bilateral lower extremities. Patient recently returned from [place] and yesterday started to notice that both of his calves. Red and warm to touch. He does not think that he had any open sores in these areas. He endorses that they are itchy and minimally tender but do not have any other abnormal findings per patient. He is concerned it could be infected although he notes he has never had a skin infection like this before. He endorses that his blood sugars have been controlled on metformin. Prior to Admission Medications   Prescriptions Last Dose Informant Patient Reported?  Taking?   atorvastatin (LIPITOR) 40 mg tablet  Self No No   Sig: Take 1 tablet (40 mg total) by mouth daily for 180 days   clotrimazole-betamethasone (LOTRISONE) 1-0.05 % cream  Self No No   Sig: Apply to affected area 2 times daily   fenofibrate (TRICOR) 145 mg tablet   No No   Sig: Take 1 tablet (145 mg total) by mouth daily   ketoconazole (NIZORAL) 2 % cream  Self No No   Sig: Apply topically daily for 30 days   levothyroxine (SYNTHROID) 300 MCG tablet   No No   Sig: Take 1 tablet (300 mcg total) by mouth daily   metFORMIN (GLUCOPHAGE) 1000 MG tablet  Self No No   Sig: Take 1 tablet (1,000 mg total) by mouth 2 (two) times a day with meals for 180 days      Facility-Administered Medications: None       Past Medical History:   Diagnosis Date   • Deformity of rib     Last assessed: 03 Nov 2015   • Diabetes mellitus [facility])    • Family history of thyroid problem    • Hydrocele    • Hypertension    • Neutrophilia     Last Assessed - 03 Nov 2015   • Sleep apnea        Past Surgical History:   Procedure Laterality Date   • TOOTH EXTRACTION 07/12/2018       Family History   Problem Relation Age of Onset   • Diabetes Mother    • Hypertension Mother    • Thyroid disease Mother         hypothyroidism   • Heart disease Mother    • Hyperlipidemia Mother    • Hyperlipidemia Father    • Hypertension Father    • Heart disease Father    • Heart attack Brother    • Hypertension Other    • Diabetes Other      I have reviewed and agree with the history as documented. E-Cigarette/Vaping     E-Cigarette/Vaping Substances     Social History     Tobacco Use   • Smoking status: Every Day     Packs/day: 0.25     Years: 15.00     Total pack years: 3.75     Types: Cigarettes   • Smokeless tobacco: Never   • Tobacco comments:     Patient is trying to quit   Substance Use Topics   • Alcohol use: Yes     Comment: Socially   • Drug use: Yes     Types: Marijuana        Review of Systems   All other systems reviewed and are negative. Physical Exam  ED Triage Vitals [09/05/23 1055]   Temperature Pulse Respirations Blood Pressure SpO2   98 °F (36.7 °C) 68 18 154/86 96 %      Temp Source Heart Rate Source Patient Position - Orthostatic VS BP Location FiO2 (%)   Oral Monitor Lying Right arm --      Pain Score       6             Orthostatic Vital Signs  Vitals:    09/05/23 1055   BP: 154/86   Pulse: 68   Patient Position - Orthostatic VS: Lying       Physical Exam  Vitals and nursing note reviewed. Constitutional:       General: He is not in acute distress. Appearance: He is well-developed. HENT:      Head: Normocephalic and atraumatic. Eyes:      Conjunctiva/sclera: Conjunctivae normal.   Cardiovascular:      Rate and Rhythm: Normal rate and regular rhythm. Heart sounds: No murmur heard. Pulmonary:      Effort: Pulmonary effort is normal. No respiratory distress. Breath sounds: Normal breath sounds. Abdominal:      Palpations: Abdomen is soft. Tenderness: There is no abdominal tenderness. Musculoskeletal:         General: No swelling. Cervical back: Neck supple. Right lower leg: Edema present. Left lower leg: Edema present. Skin:     General: Skin is warm and dry. Capillary Refill: Capillary refill takes less than 2 seconds. Findings: Erythema present. Comments: Erythema and induration of bilateral distal 1/3 of calfs. Hot to touch. Nontender. No open sores or palpable abscesses   Neurological:      Mental Status: He is alert. Psychiatric:         Mood and Affect: Mood normal.         ED Medications  Medications   cephalexin (KEFLEX) capsule 500 mg (500 mg Oral Given 9/5/23 1231)       Diagnostic Studies  Results Reviewed     None                 No orders to display         Procedures  Procedures      ED Course                                       Medical Decision Making  80-year-old male with history of diabetes presenting emergency department due to presentation concerning for bilateral lower extremity cellulitis. Also considered possibility of DVT although unlikely at this point in time. This may represent venous dermatitis. Otherwise patient ahs reassuring vital signs and no symptoms of systemic disease requiring lab testing. Will treat with keflex and have patient f/u w/ PCP. Also discussed lack of response indicating possibility of alternative diagnosis or need for antibiotic change. Patient understanding and agreeable with plan of trial abx and f/u. Also discussed signs/symptoms of DVT/PE. Discharged with home care recs and return precautions. Risk  Prescription drug management.             Disposition  Final diagnoses:   Cellulitis     Time reflects when diagnosis was documented in both MDM as applicable and the Disposition within this note     Time User Action Codes Description Comment    9/5/2023 12:28 PM Sreedhar Camarena Add [C69.03] Cellulitis       ED Disposition     ED Disposition   Discharge    Condition   Stable    Date/Time   Tue Sep 5, 2023 12:28 PM    [address] discharge to home/self care. Follow-up Information    None         Discharge Medication List as of 9/5/2023 12:32 PM      START taking these medications    Details   cephalexin (KEFLEX) 500 mg capsule Take 1 capsule (500 mg total) by mouth every 6 (six) hours for 5 days, Starting Tue 9/5/2023, Until Sun 9/10/2023, Normal         CONTINUE these medications which have NOT CHANGED    Details   atorvastatin (LIPITOR) 40 mg tablet Take 1 tablet (40 mg total) by mouth daily for 180 days, Starting Mon 8/12/2019, Until Sat 2/8/2020, Normal      clotrimazole-betamethasone (LOTRISONE) 1-0.05 % cream Apply to affected area 2 times daily, Normal      fenofibrate (TRICOR) 145 mg tablet Take 1 tablet (145 mg total) by mouth daily, Starting Fri 8/30/2019, Normal      ketoconazole (NIZORAL) 2 % cream Apply topically daily for 30 days, Starting Fri 8/3/2018, Until Fri 8/30/2019, Normal      levothyroxine (SYNTHROID) 300 MCG tablet Take 1 tablet (300 mcg total) by mouth daily, Starting Mon 11/4/2019, Until Sun 2/2/2020, Normal      metFORMIN (GLUCOPHAGE) 1000 MG tablet Take 1 tablet (1,000 mg total) by mouth 2 (two) times a day with meals for 180 days, Starting Fri 5/31/2019, Until Wed 11/27/2019, Normal           No discharge procedures on file. PDMP Review     None           ED Provider  Attending physically available and evaluated Shirley Gautam. I managed the patient along with the ED Attending.     Electronically Signed by         Roberto Carlos La MD  09/07/23 1229

## 2023-09-05 NOTE — ED ATTENDING ATTESTATION
9/5/2023  I, Sydney Bansal MD, saw and evaluated the patient. I have discussed the patient with the resident/non-physician practitioner and agree with the resident's/non-physician practitioner's findings, Plan of Care, and MDM as documented in the resident's/non-physician practitioner's note, except where noted. All available labs and Radiology studies were reviewed. I was present for key portions of any procedure(s) performed by the resident/non-physician practitioner and I was immediately available to provide assistance. At this point I agree with the current assessment done in the Emergency Department. I have conducted an independent evaluation of this patient a history and physical is as follows:   Pt presents with bilateral redness to back of  Lower extremities since this am no fevers PE: alert there is focal areas of redness with warm Legs are bilat edematous (chronic)there are some breaks to skin in legs nialt good  nv MDM: will treat with keflex pt instructed if worsen return   ED Course         Critical Care Time  Procedures